# Patient Record
Sex: MALE | Race: WHITE | Employment: OTHER | URBAN - NONMETROPOLITAN AREA
[De-identification: names, ages, dates, MRNs, and addresses within clinical notes are randomized per-mention and may not be internally consistent; named-entity substitution may affect disease eponyms.]

---

## 2020-12-01 ENCOUNTER — OFFICE VISIT (OUTPATIENT)
Dept: PRIMARY CARE CLINIC | Age: 70
End: 2020-12-01
Payer: MEDICARE

## 2020-12-01 VITALS
HEIGHT: 72 IN | BODY MASS INDEX: 42.66 KG/M2 | HEART RATE: 76 BPM | RESPIRATION RATE: 20 BRPM | TEMPERATURE: 97.3 F | DIASTOLIC BLOOD PRESSURE: 82 MMHG | WEIGHT: 315 LBS | OXYGEN SATURATION: 96 % | SYSTOLIC BLOOD PRESSURE: 142 MMHG

## 2020-12-01 PROBLEM — R03.0 ELEVATED BP WITHOUT DIAGNOSIS OF HYPERTENSION: Status: ACTIVE | Noted: 2020-12-01

## 2020-12-01 PROBLEM — Z98.890 S/P TENDON REPAIR: Status: ACTIVE | Noted: 2018-11-15

## 2020-12-01 PROBLEM — N52.8 OTHER MALE ERECTILE DYSFUNCTION: Status: ACTIVE | Noted: 2020-12-01

## 2020-12-01 PROBLEM — G25.81 RESTLESS LEG SYNDROME, CONTROLLED: Status: ACTIVE | Noted: 2020-12-01

## 2020-12-01 PROCEDURE — 99203 OFFICE O/P NEW LOW 30 MIN: CPT | Performed by: FAMILY MEDICINE

## 2020-12-01 RX ORDER — CLONAZEPAM 2 MG/1
TABLET ORAL
COMMUNITY
End: 2021-11-04 | Stop reason: ALTCHOICE

## 2020-12-01 RX ORDER — ALBUTEROL SULFATE 90 UG/1
2 AEROSOL, METERED RESPIRATORY (INHALATION) EVERY 6 HOURS PRN
COMMUNITY

## 2020-12-01 RX ORDER — SILDENAFIL 100 MG/1
100 TABLET, FILM COATED ORAL PRN
COMMUNITY
End: 2020-12-01

## 2020-12-01 RX ORDER — MOMETASONE FUROATE 50 UG/1
SPRAY, METERED NASAL
COMMUNITY

## 2020-12-01 RX ORDER — FINASTERIDE 5 MG/1
5 TABLET, FILM COATED ORAL DAILY
COMMUNITY
End: 2021-01-04 | Stop reason: SDUPTHER

## 2020-12-01 RX ORDER — FUROSEMIDE 20 MG/1
20 TABLET ORAL DAILY
COMMUNITY
End: 2021-06-01 | Stop reason: SDUPTHER

## 2020-12-01 RX ORDER — ALBUTEROL SULFATE 90 UG/1
2 AEROSOL, METERED RESPIRATORY (INHALATION) 4 TIMES DAILY PRN
Qty: 1 INHALER | Refills: 5 | Status: CANCELLED | OUTPATIENT
Start: 2020-12-01

## 2020-12-01 RX ORDER — TADALAFIL 10 MG/1
10 TABLET ORAL PRN
Qty: 5 TABLET | Refills: 3 | Status: SHIPPED
Start: 2020-12-01 | End: 2021-10-04 | Stop reason: SDUPTHER

## 2020-12-01 ASSESSMENT — PATIENT HEALTH QUESTIONNAIRE - PHQ9
SUM OF ALL RESPONSES TO PHQ QUESTIONS 1-9: 0
SUM OF ALL RESPONSES TO PHQ9 QUESTIONS 1 & 2: 0
SUM OF ALL RESPONSES TO PHQ QUESTIONS 1-9: 0
SUM OF ALL RESPONSES TO PHQ QUESTIONS 1-9: 0
2. FEELING DOWN, DEPRESSED OR HOPELESS: 0
1. LITTLE INTEREST OR PLEASURE IN DOING THINGS: 0

## 2020-12-01 ASSESSMENT — ENCOUNTER SYMPTOMS
ALLERGIC/IMMUNOLOGIC NEGATIVE: 1
SHORTNESS OF BREATH: 0
EYES NEGATIVE: 1
CHEST TIGHTNESS: 0

## 2020-12-01 NOTE — PROGRESS NOTES
status:      Spouse name: Not on file    Number of children: Not on file    Years of education: Not on file    Highest education level: Not on file   Occupational History    Not on file   Social Needs    Financial resource strain: Not on file    Food insecurity     Worry: Not on file     Inability: Not on file    Transportation needs     Medical: Not on file     Non-medical: Not on file   Tobacco Use    Smoking status: Not on file   Substance and Sexual Activity    Alcohol use: Not on file    Drug use: Not on file    Sexual activity: Not on file   Lifestyle    Physical activity     Days per week: Not on file     Minutes per session: Not on file    Stress: Not on file   Relationships    Social connections     Talks on phone: Not on file     Gets together: Not on file     Attends [employer] service: Not on file     Active member of club or organization: Not on file     Attends meetings of clubs or organizations: Not on file     Relationship status: Not on file    Intimate partner violence     Fear of current or ex partner: Not on file     Emotionally abused: Not on file     Physically abused: Not on file     Forced sexual activity: Not on file   Other Topics Concern    Not on file   Social History Narrative    Not on file        No family history on file. Vitals:    12/01/20 1018 12/01/20 1029   BP: (!) 142/80 (!) 142/82   Pulse: 76    Resp: 20    Temp: 97.3 °F (36.3 °C)    TempSrc: Temporal    SpO2: 96%    Weight: (!) 339 lb (153.8 kg)    Height: 6' (1.829 m)      Estimated body mass index is 45.98 kg/m² as calculated from the following:    Height as of this encounter: 6' (1.829 m). Weight as of this encounter: 339 lb (153.8 kg). Physical Exam  Constitutional:       Appearance: Normal appearance. He is obese. HENT:      Head: Normocephalic and atraumatic. Cardiovascular:      Rate and Rhythm: Normal rate and regular rhythm. Heart sounds: Normal heart sounds.    Pulmonary: Effort: Pulmonary effort is normal.      Breath sounds: Normal breath sounds. Abdominal:      General: Bowel sounds are normal.   Musculoskeletal: Normal range of motion. Skin:     General: Skin is warm. Neurological:      General: No focal deficit present. Mental Status: He is alert. Psychiatric:         Mood and Affect: Mood normal.         Behavior: Behavior normal.         ASSESSMENT/PLAN:  1. Other male erectile dysfunction  Change back to Cialis which works better for him     Reassess BP at f/u     Return in about 4 months (around 4/1/2021) for routine f/u . An  electronic signature was used to authenticate this note.     --Sudheer Cary MD on 12/1/2020 at 8:15 AM

## 2021-01-05 RX ORDER — FINASTERIDE 5 MG/1
5 TABLET, FILM COATED ORAL DAILY
Qty: 30 TABLET | Refills: 3 | Status: SHIPPED
Start: 2021-01-05 | End: 2021-06-01

## 2021-01-13 ENCOUNTER — APPOINTMENT (OUTPATIENT)
Dept: GENERAL RADIOLOGY | Age: 71
End: 2021-01-13
Payer: MEDICARE

## 2021-01-13 ENCOUNTER — HOSPITAL ENCOUNTER (EMERGENCY)
Age: 71
Discharge: HOME | End: 2021-01-13
Attending: EMERGENCY MEDICINE
Payer: MEDICARE

## 2021-01-13 VITALS
RESPIRATION RATE: 19 BRPM | HEART RATE: 79 BPM | TEMPERATURE: 97.5 F | OXYGEN SATURATION: 96 % | DIASTOLIC BLOOD PRESSURE: 75 MMHG | SYSTOLIC BLOOD PRESSURE: 134 MMHG

## 2021-01-13 DIAGNOSIS — S61.219A: Primary | ICD-10-CM

## 2021-01-13 DIAGNOSIS — Z23 NEED FOR TETANUS BOOSTER: ICD-10-CM

## 2021-01-13 PROCEDURE — 6370000000 HC RX 637 (ALT 250 FOR IP): Performed by: PHYSICIAN ASSISTANT

## 2021-01-13 PROCEDURE — 99284 EMERGENCY DEPT VISIT MOD MDM: CPT

## 2021-01-13 PROCEDURE — 2500000003 HC RX 250 WO HCPCS

## 2021-01-13 PROCEDURE — 90715 TDAP VACCINE 7 YRS/> IM: CPT | Performed by: PHYSICIAN ASSISTANT

## 2021-01-13 PROCEDURE — 6360000002 HC RX W HCPCS: Performed by: PHYSICIAN ASSISTANT

## 2021-01-13 PROCEDURE — 73140 X-RAY EXAM OF FINGER(S): CPT

## 2021-01-13 PROCEDURE — 96372 THER/PROPH/DIAG INJ SC/IM: CPT

## 2021-01-13 PROCEDURE — 12001 RPR S/N/AX/GEN/TRNK 2.5CM/<: CPT

## 2021-01-13 PROCEDURE — 90471 IMMUNIZATION ADMIN: CPT | Performed by: PHYSICIAN ASSISTANT

## 2021-01-13 RX ORDER — AMOXICILLIN AND CLAVULANATE POTASSIUM 875; 125 MG/1; MG/1
1 TABLET, FILM COATED ORAL ONCE
Status: COMPLETED | OUTPATIENT
Start: 2021-01-13 | End: 2021-01-13

## 2021-01-13 RX ORDER — LIDOCAINE HYDROCHLORIDE 10 MG/ML
INJECTION, SOLUTION INFILTRATION; PERINEURAL
Status: COMPLETED
Start: 2021-01-13 | End: 2021-01-13

## 2021-01-13 RX ORDER — AMOXICILLIN AND CLAVULANATE POTASSIUM 875; 125 MG/1; MG/1
1 TABLET, FILM COATED ORAL 2 TIMES DAILY
Qty: 14 TABLET | Refills: 0 | Status: SHIPPED | OUTPATIENT
Start: 2021-01-13 | End: 2021-01-20

## 2021-01-13 RX ORDER — LIDOCAINE HYDROCHLORIDE 10 MG/ML
20 INJECTION, SOLUTION INFILTRATION; PERINEURAL ONCE
Status: COMPLETED | OUTPATIENT
Start: 2021-01-13 | End: 2021-01-13

## 2021-01-13 RX ADMIN — TETANUS TOXOID, REDUCED DIPHTHERIA TOXOID AND ACELLULAR PERTUSSIS VACCINE, ADSORBED 0.5 ML: 5; 2.5; 8; 8; 2.5 SUSPENSION INTRAMUSCULAR at 12:30

## 2021-01-13 RX ADMIN — LIDOCAINE HYDROCHLORIDE 20 ML: 10 INJECTION, SOLUTION INFILTRATION; PERINEURAL at 12:33

## 2021-01-13 RX ADMIN — AMOXICILLIN AND CLAVULANATE POTASSIUM 1 TABLET: 875; 125 TABLET, FILM COATED ORAL at 12:29

## 2021-01-13 ASSESSMENT — PAIN SCALES - GENERAL: PAINLEVEL_OUTOF10: 10

## 2021-01-13 NOTE — ED PROVIDER NOTES
ED Attending  [facility]   ED  Encounter Note  Admit Date/RoomTime: 2021 10:56 AM  ED Room:     NAME: Casandra Brooks II  : 1950  MRN: [id number]     Chief Complaint:  Laceration (2nd and 3rd digits cut with bandsaw, right hand)    History of Present Illness       Casandra Brooks II is a 69 y.o. old male presenting to the emergency department by private vehicle, for a laceration to the right  Middle and right ring finger, caused by band saw blade, which occurred at home approximately a few minute(s) prior to arrival.  There is  a possibility of retained foreign body in the affected area. Bleeding is  controlled. He takes no blood thinning agents. There is pain at injury site. Pt reports his ring finger tip is unable to fully straighten. Tetanus Status:  more than 5 years ago. ROS   Pertinent positives and negatives are stated within HPI, all other systems reviewed and are negative. Past Medical History:  has a past medical history of Asthma, Atelectasis, Benign essential HTN, BPH (benign prostatic hyperplasia), Diastolic dysfunction, BASHIR treated with BiPAP, and RLS (restless legs syndrome). Surgical History:  has a past surgical history that includes Total knee arthroplasty (Bilateral); Total shoulder arthroplasty (Left); and Ventricular ablation surgery (). Social History:  reports that he has never smoked. He has never used smokeless tobacco.    Family History: family history is not on file. Allergies: Patient has no known allergies. Physical Exam   Oxygen Saturation Interpretation: Normal.        ED Triage Vitals   BP Temp Temp src Pulse Resp SpO2 Height Weight   21 1056 21 1054 -- 21 1054 21 1054 21 1054 -- --   134/74 97 °F (36.1 °C)  80 20 95 %           Constitutional:  Alert, development consistent with age. HEENT:  NC/NT. Airway patent. Neck:  Normal ROM. Supple. Non-tender. Digits:   Right Middle finger middle phalanx ulnar aspect and Ring finger DIP joint dorsal aspect. Middle: 1.0 partial thickness laceration, non tender, full rom of finger. Ring finger: 2.0 cm deep laceration directly across the DIP joint line dorsally. Notable mallet deformity of the distal phalanx, pt unable to straighten the finger tip. When I move it into fully extended position, pt unable to maintain the position when I release it. Neurovascular: Motor deficit: see above            Sensory deficit: none. Pulse deficit: none. Capillary refill: normal.  Hand:              Tenderness:  none. Swelling: None. Deformity: no.             Skin:  no erythema, rash or wounds noted. Lymphatics: No lymphangitis or adenopathy noted. Neurological:  Oriented. Motor functions intact. Lab / Imaging Results   (All laboratory and radiology results have been personally reviewed by myself)  Labs:  No results found for this visit on 01/13/21. Imaging: All Radiology results interpreted by Radiologist unless otherwise noted. XR FINGER RIGHT (MIN 2 VIEWS)   Final Result   On lateral view, soft tissue defect is noted dorsal to the DIP joint. The   underlying bone is irregular suggesting tiny avulsion fractures at the   laceration site from the dorsal base of the distal phalanx. The extensor   tendon may be injured, not visible radiographically. ED Course / Medical Decision Making     Medications   lidocaine 1 % injection 20 mL (20 mLs Intradermal Given 1/13/21 1233)   Tetanus-Diphth-Acell Pertussis (BOOSTRIX) injection 0.5 mL (0.5 mLs Intramuscular Given 1/13/21 1230)   amoxicillin-clavulanate (AUGMENTIN) 875-125 MG per tablet 1 tablet (1 tablet Oral Given 1/13/21 1229)        Consult(s):   IP CONSULT TO ORTHOPEDIC SURGERY Irrigate, repair, atb, will see in office today or tomorrow.     Procedure(s):   LACERATION REPAIR PROCEDURE NOTE: Risks, benefits and alternatives (for applicable procedures below) described. Performed By: Myself}     Location: right ring finger. Length: 2.2 cm. The wound area was irrigated with sterile saline, cleansend with shur-clens and draped in a sterile fashion. Local anesthesia Lidocaine 1% without epinephrine. The wound was explored with the following results: extensor tendon laceration appears complete. Debridement: None. Undermining: None. Wound Margins Revised: no.   Flaps Aligned: yes. Single layer wound closure was performed. The skin was closed with #4 4-0 Ethilon using interrupted sutures. .   Dressing a sterile dressing, a band-aid and static splint on finger and OCL on 4th and 5th fingers and hand was placed. MDM:   Imaging was obtained based on high suspicion for fractureas per history/physical findings. Patient's tetanus was updated in department. Orthopedic surgery was consulted due to the extensor tendon laceration. They will be managing the care of this injury outpatient. Patient started on antibiotics in ED. Information for orthopedic surgery location in office provided to patient. Plan of Care/Counseling:  Emergency Attending Physician and I reviewed today's visit with the patient in addition to providing specific details for the plan of care and counseling regarding the diagnosis and prognosis. Questions are answered at this time and are agreeable with the plan. Assessment     1. Laceration of finger, right, with tendon, initial encounter    2. Need for tetanus booster      Plan   Discharged home.   Patient condition is stable    New Medications     Discharge Medication List as of 1/13/2021  1:22 PM      START taking these medications    Details   amoxicillin-clavulanate (AUGMENTIN) 875-125 MG per tablet Take 1 tablet by mouth 2 times daily for 7 days, Disp-14 tablet, R-0Normal           Electronically signed by Dixie Angel PA-C   DD: 1/13/21  **This report was transcribed using voice recognition software. Every effort was made to ensure accuracy; however, inadvertent computerized transcription errors may be present.   END OF ED PROVIDER NOTE       Mare Aleman PA-C  01/13/21 1639

## 2021-06-01 RX ORDER — FUROSEMIDE 20 MG/1
20 TABLET ORAL DAILY
Qty: 60 TABLET | Refills: 5 | Status: SHIPPED
Start: 2021-06-01 | End: 2021-07-14 | Stop reason: SDUPTHER

## 2021-06-01 RX ORDER — POTASSIUM CHLORIDE 20 MEQ/1
20 TABLET, EXTENDED RELEASE ORAL DAILY
Qty: 60 TABLET | Refills: 5 | Status: SHIPPED
Start: 2021-06-01 | End: 2021-09-30 | Stop reason: SDUPTHER

## 2021-06-01 RX ORDER — FINASTERIDE 5 MG/1
TABLET, FILM COATED ORAL
Qty: 90 TABLET | Refills: 0 | Status: SHIPPED
Start: 2021-06-01 | End: 2021-06-02

## 2021-06-02 RX ORDER — FINASTERIDE 5 MG/1
TABLET, FILM COATED ORAL
Qty: 90 TABLET | Refills: 0 | Status: SHIPPED
Start: 2021-06-02 | End: 2021-11-04 | Stop reason: SDUPTHER

## 2021-08-31 RX ORDER — FUROSEMIDE 20 MG/1
TABLET ORAL
Qty: 60 TABLET | Refills: 0 | Status: SHIPPED
Start: 2021-08-31 | End: 2021-09-30 | Stop reason: SDUPTHER

## 2021-10-01 RX ORDER — FUROSEMIDE 20 MG/1
20 TABLET ORAL DAILY
Qty: 90 TABLET | Refills: 1 | Status: SHIPPED
Start: 2021-10-01 | End: 2022-04-14

## 2021-10-01 RX ORDER — POTASSIUM CHLORIDE 20 MEQ/1
20 TABLET, EXTENDED RELEASE ORAL DAILY
Qty: 90 TABLET | Refills: 1 | Status: SHIPPED
Start: 2021-10-01 | End: 2022-05-04 | Stop reason: SDUPTHER

## 2021-10-04 RX ORDER — TADALAFIL 10 MG/1
10 TABLET ORAL PRN
Qty: 5 TABLET | Refills: 3 | Status: SHIPPED
Start: 2021-10-04 | End: 2022-05-04 | Stop reason: SDUPTHER

## 2021-11-04 ENCOUNTER — OFFICE VISIT (OUTPATIENT)
Dept: PRIMARY CARE CLINIC | Age: 71
End: 2021-11-04
Payer: MEDICARE

## 2021-11-04 VITALS
BODY MASS INDEX: 42.66 KG/M2 | SYSTOLIC BLOOD PRESSURE: 126 MMHG | OXYGEN SATURATION: 96 % | DIASTOLIC BLOOD PRESSURE: 70 MMHG | HEART RATE: 72 BPM | WEIGHT: 315 LBS | TEMPERATURE: 97.8 F | RESPIRATION RATE: 18 BRPM | HEIGHT: 72 IN

## 2021-11-04 DIAGNOSIS — Z13.220 SCREENING CHOLESTEROL LEVEL: ICD-10-CM

## 2021-11-04 DIAGNOSIS — R35.1 BENIGN PROSTATIC HYPERPLASIA WITH NOCTURIA: ICD-10-CM

## 2021-11-04 DIAGNOSIS — N40.1 BENIGN PROSTATIC HYPERPLASIA WITH NOCTURIA: ICD-10-CM

## 2021-11-04 DIAGNOSIS — I51.89 DIASTOLIC DYSFUNCTION: Primary | ICD-10-CM

## 2021-11-04 DIAGNOSIS — G47.33 OSA TREATED WITH BIPAP: ICD-10-CM

## 2021-11-04 DIAGNOSIS — Z12.5 PROSTATE CANCER SCREENING: ICD-10-CM

## 2021-11-04 DIAGNOSIS — Z12.11 COLON CANCER SCREENING: ICD-10-CM

## 2021-11-04 DIAGNOSIS — E66.01 MORBID OBESITY (HCC): ICD-10-CM

## 2021-11-04 PROCEDURE — 99214 OFFICE O/P EST MOD 30 MIN: CPT | Performed by: FAMILY MEDICINE

## 2021-11-04 RX ORDER — CLONAZEPAM 1 MG/1
2 TABLET ORAL NIGHTLY PRN
COMMUNITY
Start: 2021-10-04

## 2021-11-04 RX ORDER — ASPIRIN 81 MG/1
TABLET ORAL
COMMUNITY
End: 2022-05-04 | Stop reason: ALTCHOICE

## 2021-11-04 RX ORDER — FINASTERIDE 5 MG/1
TABLET, FILM COATED ORAL
Qty: 90 TABLET | Refills: 1 | Status: SHIPPED
Start: 2021-11-04 | End: 2022-05-04 | Stop reason: SDUPTHER

## 2021-11-04 SDOH — ECONOMIC STABILITY: FOOD INSECURITY: WITHIN THE PAST 12 MONTHS, YOU WORRIED THAT YOUR FOOD WOULD RUN OUT BEFORE YOU GOT MONEY TO BUY MORE.: NEVER TRUE

## 2021-11-04 SDOH — ECONOMIC STABILITY: FOOD INSECURITY: WITHIN THE PAST 12 MONTHS, THE FOOD YOU BOUGHT JUST DIDN'T LAST AND YOU DIDN'T HAVE MONEY TO GET MORE.: NEVER TRUE

## 2021-11-04 ASSESSMENT — PATIENT HEALTH QUESTIONNAIRE - PHQ9
2. FEELING DOWN, DEPRESSED OR HOPELESS: 0
SUM OF ALL RESPONSES TO PHQ QUESTIONS 1-9: 0
SUM OF ALL RESPONSES TO PHQ9 QUESTIONS 1 & 2: 0
SUM OF ALL RESPONSES TO PHQ QUESTIONS 1-9: 0
SUM OF ALL RESPONSES TO PHQ QUESTIONS 1-9: 0
1. LITTLE INTEREST OR PLEASURE IN DOING THINGS: 0

## 2021-11-04 ASSESSMENT — SOCIAL DETERMINANTS OF HEALTH (SDOH): HOW HARD IS IT FOR YOU TO PAY FOR THE VERY BASICS LIKE FOOD, HOUSING, MEDICAL CARE, AND HEATING?: NOT HARD AT ALL

## 2021-11-04 ASSESSMENT — ENCOUNTER SYMPTOMS
GASTROINTESTINAL NEGATIVE: 1
RESPIRATORY NEGATIVE: 1

## 2021-11-04 NOTE — PROGRESS NOTES
21  Braulio Arteaga : 1950 Sex: male  Age: 66 y.o. Assessment and Plan:  Bisi Claudio was seen today for follow-up. Diagnoses and all orders for this visit:    Diastolic dysfunction  -     Basic Metabolic Panel; Future  -     Hepatic Function Panel; Future  -     CBC Auto Differential; Future  -     Lipid Panel; Future  -     MAGNESIUM; Future    Screening cholesterol level  -     Hepatic Function Panel; Future  -     Lipid Panel; Future  -     POCT Fit Test; Future    BMI 40.0-44.9, [facility])  -     Basic Metabolic Panel; Future  -     Hepatic Function Panel; Future    Morbid obesity ([facility] [address]  -     Basic Metabolic Panel; Future  -     Hepatic Function Panel; Future  -     CBC Auto Differential; Future  -     Lipid Panel; Future    BASHIR treated with BiPAP    Benign prostatic hyperplasia with nocturia  -     finasteride (PROSCAR) 5 MG tablet; Take 1 tablet by mouth once daily  -     PSA screening; Future    Prostate cancer screening  -     PSA screening; Future    Colon cancer screening      Pt was strongly encouraged to call his cardiologist and set up an appointment in light of his reported episode  Continue current medications otherwise, refills as necessary  Discussed with the patient that he can try himself off of the Proscar for a couple of weeks to see if he notes any difference  Update routine annual blood work  Further recommendations pending results  Due for updated CRC screening  We will hold off on swallow evaluation at this time, but the patient will let me know if his symptoms recur  Dietary and lifestyle improvements recommended  Declines flu/PPSV despite my recommendation  Declines COVID, again despite my recommendation    Return in about 6 months (around 2022).         Chief Complaint   Patient presents with    Follow-up       HPI  Pt here for routine f/u     BASHIR on BiPAP -  He was having 40 events per hour, but got new machine with new settings  Last night down to 14 events per hour  He follows with pulmonology, Dr. Carlos Pete, for this as well as his restless leg    Pt c/o choking on his own spit   Lately has been better actually - past three weeks   He has rarely choked on food, but nothing that seems of concern    Pt states he also had a \"little episode with his heart\"  \"Felt like the V tach came back, but converted after 10 seconds or so\"  Felt a heavy pressure in his chest, but he just pulled his shoulders back and it passed     ED - still taking Cialis but it's not working as well as he'd like  BPH -patient has been on Proscar for quite some time and is not sure if it is helpful  Diastolic dysfunction -patient remains on Lasix and potassium daily      Problem list reviewed and updated in full with patient today as necessary. A comprehensive ROS was negative, except as documented above. Review of Systems   Respiratory: Negative. Cardiovascular:        Episode of chest pain as above   Gastrointestinal: Negative. Genitourinary: Negative. Psychiatric/Behavioral: Negative.         Current Outpatient Medications:     clonazePAM (KLONOPIN) 1 MG tablet, , Disp: , Rfl:     aspirin 81 MG EC tablet, None Entered, Disp: , Rfl:     finasteride (PROSCAR) 5 MG tablet, Take 1 tablet by mouth once daily, Disp: 90 tablet, Rfl: 1    tadalafil (CIALIS) 10 MG tablet, Take 1 tablet by mouth as needed for Erectile Dysfunction, Disp: 5 tablet, Rfl: 3    potassium chloride (KLOR-CON M20) 20 MEQ extended release tablet, Take 1 tablet by mouth daily, Disp: 90 tablet, Rfl: 1    furosemide (LASIX) 20 MG tablet, Take 1 tablet by mouth daily, Disp: 90 tablet, Rfl: 1    mometasone (NASONEX) 50 MCG/ACT nasal spray, None Entered, Disp: , Rfl:     albuterol sulfate  (90 Base) MCG/ACT inhaler, Inhale 2 puffs into the lungs every 6 hours as needed, Disp: , Rfl:   No Known Allergies    Pt's past medical and surgical history were reviewed and updated as necessary today   Pt's family and social history were reviewed and updated as necessary today        Vitals:    11/04/21 0915   BP: 126/70   Pulse: 72   Resp: 18   Temp: 97.8 °F (36.6 °C)   TempSrc: Temporal   SpO2: 96%   Weight: (!) 321 lb (145.6 kg)   Height: 6' (1.829 m)       Physical Exam  Constitutional:       Appearance: Normal appearance. HENT:      Head: Normocephalic and atraumatic. Eyes:      Conjunctiva/sclera: Conjunctivae normal.   Cardiovascular:      Rate and Rhythm: Normal rate and regular rhythm. Comments: Very distant   Pulmonary:      Effort: Pulmonary effort is normal.      Breath sounds: Normal breath sounds. Abdominal:      Palpations: Abdomen is soft. Tenderness: There is no abdominal tenderness. Musculoskeletal:         General: Normal range of motion. Skin:     General: Skin is warm and dry. Neurological:      General: No focal deficit present. Mental Status: He is alert and oriented to person, place, and time. Psychiatric:         Mood and Affect: Mood normal.         Behavior: Behavior normal.       Counseled patient as appropriate and relevant regarding above diagnosis, including possible risks and complications, especially if left uncontrolled. Counseled patient as appropriate and relevant regarding any  possible side effects, risks, and alternatives to treatment; patient and/or guardian verbalizes understanding, and is in agreement with the plan as detailed above. Reviewed age and gender appropriate health screening exams and vaccinations. Advised patient regarding importance of keeping up with recommended health maintenance and to schedule as soon as possible if overdue, as this is important in assessing for undiagnosed pathology, especially cancer, as well as protecting against potentially harmful/life threatening disease.       If discussed, any educational materials and/or home exercises printed for patient's review and were included in patient instructions on his/her After Visit Summary and given to patient at the end of visit. Advised patient to call with any new medication issues, and and other concerns/complaints prior to scheduled follow up. All questions answered to the patient's satisfaction.         Seen By:  Richard Martel MD

## 2021-12-30 LAB
CONTROL: NORMAL
HEMOCCULT STL QL: NORMAL

## 2022-01-04 DIAGNOSIS — Z13.220 SCREENING CHOLESTEROL LEVEL: ICD-10-CM

## 2022-01-04 PROCEDURE — 82274 ASSAY TEST FOR BLOOD FECAL: CPT | Performed by: FAMILY MEDICINE

## 2022-04-14 RX ORDER — FUROSEMIDE 20 MG/1
TABLET ORAL
Qty: 90 TABLET | Refills: 1 | Status: SHIPPED | OUTPATIENT
Start: 2022-04-14

## 2022-05-04 ENCOUNTER — OFFICE VISIT (OUTPATIENT)
Dept: PRIMARY CARE CLINIC | Age: 72
End: 2022-05-04
Payer: MEDICARE

## 2022-05-04 VITALS
SYSTOLIC BLOOD PRESSURE: 136 MMHG | HEART RATE: 75 BPM | OXYGEN SATURATION: 96 % | RESPIRATION RATE: 18 BRPM | BODY MASS INDEX: 42.66 KG/M2 | HEIGHT: 72 IN | WEIGHT: 315 LBS | DIASTOLIC BLOOD PRESSURE: 82 MMHG | TEMPERATURE: 98 F

## 2022-05-04 DIAGNOSIS — N52.8 OTHER MALE ERECTILE DYSFUNCTION: ICD-10-CM

## 2022-05-04 DIAGNOSIS — I51.89 DIASTOLIC DYSFUNCTION: ICD-10-CM

## 2022-05-04 DIAGNOSIS — R35.1 BENIGN PROSTATIC HYPERPLASIA WITH NOCTURIA: ICD-10-CM

## 2022-05-04 DIAGNOSIS — E66.01 MORBID OBESITY (HCC): Primary | ICD-10-CM

## 2022-05-04 DIAGNOSIS — N40.1 BENIGN PROSTATIC HYPERPLASIA WITH NOCTURIA: ICD-10-CM

## 2022-05-04 DIAGNOSIS — G47.33 OSA TREATED WITH BIPAP: ICD-10-CM

## 2022-05-04 PROCEDURE — 99214 OFFICE O/P EST MOD 30 MIN: CPT | Performed by: FAMILY MEDICINE

## 2022-05-04 RX ORDER — FINASTERIDE 5 MG/1
TABLET, FILM COATED ORAL
Qty: 90 TABLET | Refills: 1 | Status: SHIPPED
Start: 2022-05-04 | End: 2022-11-02

## 2022-05-04 RX ORDER — KETOCONAZOLE 20 MG/ML
SHAMPOO TOPICAL
COMMUNITY
Start: 2022-03-09

## 2022-05-04 RX ORDER — TADALAFIL 20 MG/1
20 TABLET ORAL PRN
Qty: 30 TABLET | Refills: 0 | Status: SHIPPED | OUTPATIENT
Start: 2022-05-04

## 2022-05-04 RX ORDER — POTASSIUM CHLORIDE 20 MEQ/1
20 TABLET, EXTENDED RELEASE ORAL DAILY
Qty: 90 TABLET | Refills: 1 | Status: SHIPPED
Start: 2022-05-04 | End: 2022-08-26 | Stop reason: SDUPTHER

## 2022-05-04 NOTE — PROGRESS NOTES
22  Marden Channel II : 1950 Sex: male  Age: 66 y.o. Assessment and Plan:  Jolanta Walters was seen today for follow-up and medication refill. Diagnoses and all orders for this visit:    Morbid obesity ([address]    Benign prostatic hyperplasia with nocturia  -     finasteride (PROSCAR) 5 MG tablet; Take 1 tablet by mouth once daily    BASHIR treated with BiPAP    Diastolic dysfunction  -     potassium chloride (KLOR-CON M20) 20 MEQ extended release tablet; Take 1 tablet by mouth daily    Other male erectile dysfunction  -     tadalafil (CIALIS) 20 MG tablet; Take 1 tablet by mouth as needed for Erectile Dysfunction      Increase Cialis to 20 mg  Precautions reviewed  If this medication does not work, he was encouraged to reach out and we can try switching back to Viagra  He is aware that his obesity does contribute to this issue  He was also encouraged to consider evaluation with urology for further recommendations  Patient will continue on his other current medications as he is generally stable  Refills as requested  Patient's complaint about choking on his saliva has improved recently so he will continue to monitor  He was strongly encouraged to alert me prior to follow-up should this start to worsen in any way    Pt declines AWV    Return in about 6 months (around 2022).         Chief Complaint   Patient presents with    Follow-up    Medication Refill       HPI  Pt here for routine f/u    States he's \"hanging in there\"   Sometimes he gets SOB at times  Also does get flutters at times  Did see his heart doctor and had monitor done  He was found to get PVCs at times  Seems like if he gets too many at once he gets weak  No changes to his meds otherwise  Remains on Lasix and potassium daily for his CHF/diastolic dysfunction     BASHIR on BiPAP - has been doing well with his new machine he got last fall   He follows with pulmonology, Dr. Jose De Jesus Crespo    ED - still taking Cialis but it's not working that well   BPH - patient has been on Proscar for quite some time and is not sure if it is helpful    At last OV, pt was c/o choking on his own spit at times  Swallow eval offered but he elected to just monitor at that time  Hasn't actually happened agian the past month - has lessened or gone away   He still denies any issues with food/liquids     Problem list reviewed and updated in full with patient today as necessary. A comprehensive ROS was negative, except as documented above. Review of Systems   Respiratory:        SOB comes and goes; doesn't seem exertional related    Cardiovascular: Positive for palpitations. Current Outpatient Medications:     Multiple Vitamins-Minerals (ICAPS AREDS FORMULA PO), Take by mouth, Disp: , Rfl:     ketoconazole (NIZORAL) 2 % shampoo, APPLY TOPICALLY TO SCALP 2 TO 3 DAYS PER WEEK, ALTERNATING WITH OTC ANTI-DANDRUFF SHAMPOOS EVERY MONTH AS DIRECTED, Disp: , Rfl:     Multiple Vitamin (MULTIVITAMIN ADULT PO), Take by mouth daily, Disp: , Rfl:     finasteride (PROSCAR) 5 MG tablet, Take 1 tablet by mouth once daily, Disp: 90 tablet, Rfl: 1    potassium chloride (KLOR-CON M20) 20 MEQ extended release tablet, Take 1 tablet by mouth daily, Disp: 90 tablet, Rfl: 1    tadalafil (CIALIS) 20 MG tablet, Take 1 tablet by mouth as needed for Erectile Dysfunction, Disp: 30 tablet, Rfl: 0    furosemide (LASIX) 20 MG tablet, Take 1 tablet by mouth once daily, Disp: 90 tablet, Rfl: 1    clonazePAM (KLONOPIN) 1 MG tablet, Take 2 mg by mouth nightly as needed.  , Disp: , Rfl:     mometasone (NASONEX) 50 MCG/ACT nasal spray, None Entered, Disp: , Rfl:     albuterol sulfate  (90 Base) MCG/ACT inhaler, Inhale 2 puffs into the lungs every 6 hours as needed, Disp: , Rfl:   No Known Allergies    Pt's past medical and surgical history were reviewed and updated as necessary today   Pt's family and social history were reviewed and updated as necessary today      Vitals:    05/04/22 1001   BP: 136/82   Pulse: 75   Resp: 18   Temp: 98 °F (36.7 °C)   TempSrc: Temporal   SpO2: 96%   Weight: (!) 333 lb (151 kg)   Height: 6' (1.829 m)       Physical Exam  Constitutional:       Appearance: Normal appearance. He is obese. HENT:      Head: Normocephalic and atraumatic. Eyes:      Conjunctiva/sclera: Conjunctivae normal.   Cardiovascular:      Rate and Rhythm: Normal rate and regular rhythm. Comments: Distant  Pulmonary:      Effort: Pulmonary effort is normal.      Breath sounds: Normal breath sounds. Abdominal:      Palpations: Abdomen is soft. Tenderness: There is no abdominal tenderness. Musculoskeletal:         General: Normal range of motion. Skin:     General: Skin is warm and dry. Neurological:      General: No focal deficit present. Mental Status: He is alert and oriented to person, place, and time. Psychiatric:         Mood and Affect: Mood normal.         Behavior: Behavior normal.        Counseled patient as appropriate and relevant regarding above diagnosis, including possible risks and complications, especially if left uncontrolled. Counseled patient as appropriate and relevant regarding any  possible side effects, risks, and alternatives to treatment; patient and/or guardian verbalizes understanding, and is in agreement with the plan as detailed above. Reviewed age and gender appropriate health screening exams and vaccinations. Advised patient regarding importance of keeping up with recommended health maintenance and to schedule as soon as possible if overdue, as this is important in assessing for undiagnosed pathology, especially cancer, as well as protecting against potentially harmful/life threatening disease. If discussed, any educational materials and/or home exercises printed for patient's review and were included in patient instructions on his/her After Visit Summary and given to patient at the end of visit.       Advised patient to call with any new medication issues, and and other concerns/complaints prior to scheduled follow up. All questions answered to the patient's satisfaction.         Seen By:  Dilia Guerra MD

## 2022-06-22 DIAGNOSIS — Z13.220 SCREENING CHOLESTEROL LEVEL: ICD-10-CM

## 2022-06-22 DIAGNOSIS — E66.01 MORBID OBESITY (HCC): ICD-10-CM

## 2022-06-22 LAB
ALBUMIN SERPL-MCNC: 4.2 G/DL (ref 3.5–5.2)
ALP BLD-CCNC: 60 U/L (ref 40–129)
ALT SERPL-CCNC: 18 U/L (ref 0–40)
ANION GAP SERPL CALCULATED.3IONS-SCNC: 12 MMOL/L (ref 7–16)
AST SERPL-CCNC: 22 U/L (ref 0–39)
BASOPHILS ABSOLUTE: 0.06 E9/L (ref 0–0.2)
BASOPHILS RELATIVE PERCENT: 0.9 % (ref 0–2)
BILIRUB SERPL-MCNC: 1.1 MG/DL (ref 0–1.2)
BILIRUBIN DIRECT: <0.2 MG/DL (ref 0–0.3)
BILIRUBIN, INDIRECT: NORMAL MG/DL (ref 0–1)
BUN BLDV-MCNC: 13 MG/DL (ref 6–23)
CALCIUM SERPL-MCNC: 9.4 MG/DL (ref 8.6–10.2)
CHLORIDE BLD-SCNC: 103 MMOL/L (ref 98–107)
CHOLESTEROL, TOTAL: 188 MG/DL (ref 0–199)
CO2: 23 MMOL/L (ref 22–29)
CREAT SERPL-MCNC: 0.8 MG/DL (ref 0.7–1.2)
EOSINOPHILS ABSOLUTE: 0.31 E9/L (ref 0.05–0.5)
EOSINOPHILS RELATIVE PERCENT: 4.8 % (ref 0–6)
GFR AFRICAN AMERICAN: >60
GFR NON-AFRICAN AMERICAN: >60 ML/MIN/1.73
GLUCOSE BLD-MCNC: 87 MG/DL (ref 74–99)
HCT VFR BLD CALC: 42.9 % (ref 37–54)
HDLC SERPL-MCNC: 41 MG/DL
HEMOGLOBIN: 13.7 G/DL (ref 12.5–16.5)
IMMATURE GRANULOCYTES #: 0.07 E9/L
IMMATURE GRANULOCYTES %: 1.1 % (ref 0–5)
LDL CHOLESTEROL CALCULATED: 118 MG/DL (ref 0–99)
LYMPHOCYTES ABSOLUTE: 1.73 E9/L (ref 1.5–4)
LYMPHOCYTES RELATIVE PERCENT: 26.9 % (ref 20–42)
MAGNESIUM: 2 MG/DL (ref 1.6–2.6)
MCH RBC QN AUTO: 29.1 PG (ref 26–35)
MCHC RBC AUTO-ENTMCNC: 31.9 % (ref 32–34.5)
MCV RBC AUTO: 91.1 FL (ref 80–99.9)
MONOCYTES ABSOLUTE: 0.52 E9/L (ref 0.1–0.95)
MONOCYTES RELATIVE PERCENT: 8.1 % (ref 2–12)
NEUTROPHILS ABSOLUTE: 3.75 E9/L (ref 1.8–7.3)
NEUTROPHILS RELATIVE PERCENT: 58.2 % (ref 43–80)
PDW BLD-RTO: 13.9 FL (ref 11.5–15)
PLATELET # BLD: 158 E9/L (ref 130–450)
PMV BLD AUTO: 11.3 FL (ref 7–12)
POTASSIUM SERPL-SCNC: 4.5 MMOL/L (ref 3.5–5)
RBC # BLD: 4.71 E12/L (ref 3.8–5.8)
SODIUM BLD-SCNC: 138 MMOL/L (ref 132–146)
TOTAL PROTEIN: 7.4 G/DL (ref 6.4–8.3)
TRIGL SERPL-MCNC: 145 MG/DL (ref 0–149)
VLDLC SERPL CALC-MCNC: 29 MG/DL
WBC # BLD: 6.4 E9/L (ref 4.5–11.5)

## 2022-08-26 DIAGNOSIS — I51.89 DIASTOLIC DYSFUNCTION: ICD-10-CM

## 2022-08-26 RX ORDER — POTASSIUM CHLORIDE 20 MEQ/1
20 TABLET, EXTENDED RELEASE ORAL DAILY
Qty: 90 TABLET | Refills: 1 | Status: SHIPPED | OUTPATIENT
Start: 2022-08-26

## 2022-08-26 NOTE — TELEPHONE ENCOUNTER
Last Appointment:  5/4/2022  Future Appointments   Date Time Provider Sharon Cruz   11/29/2022  9:00 AM Emmett Granger MD [facility]

## 2022-11-02 DIAGNOSIS — N40.1 BENIGN PROSTATIC HYPERPLASIA WITH NOCTURIA: ICD-10-CM

## 2022-11-02 DIAGNOSIS — R35.1 BENIGN PROSTATIC HYPERPLASIA WITH NOCTURIA: ICD-10-CM

## 2022-11-02 RX ORDER — FINASTERIDE 5 MG/1
TABLET, FILM COATED ORAL
Qty: 90 TABLET | Refills: 1 | OUTPATIENT
Start: 2022-11-02

## 2022-11-02 RX ORDER — FINASTERIDE 5 MG/1
TABLET, FILM COATED ORAL
Qty: 90 TABLET | Refills: 0 | Status: SHIPPED
Start: 2022-11-02 | End: 2022-11-29 | Stop reason: SDUPTHER

## 2022-11-02 NOTE — TELEPHONE ENCOUNTER
Last Appointment:  5/4/2022  Future Appointments   Date Time Provider Sharon Cruz   11/29/2022  9:00 AM Ankita Medrano MD [facility]    '

## 2022-11-29 ENCOUNTER — HOSPITAL ENCOUNTER (OUTPATIENT)
Dept: GENERAL RADIOLOGY | Age: 72
Discharge: HOME | End: 2022-12-01
Payer: MEDICARE

## 2022-11-29 ENCOUNTER — OFFICE VISIT (OUTPATIENT)
Dept: PRIMARY CARE CLINIC | Age: 72
End: 2022-11-29

## 2022-11-29 ENCOUNTER — HOSPITAL ENCOUNTER (OUTPATIENT)
Age: 72
Discharge: HOME | End: 2022-12-01
Payer: MEDICARE

## 2022-11-29 VITALS
BODY MASS INDEX: 42.66 KG/M2 | HEIGHT: 72 IN | WEIGHT: 315 LBS | DIASTOLIC BLOOD PRESSURE: 84 MMHG | HEART RATE: 76 BPM | SYSTOLIC BLOOD PRESSURE: 138 MMHG | OXYGEN SATURATION: 95 % | RESPIRATION RATE: 18 BRPM | TEMPERATURE: 97.7 F

## 2022-11-29 DIAGNOSIS — R35.1 BENIGN PROSTATIC HYPERPLASIA WITH NOCTURIA: ICD-10-CM

## 2022-11-29 DIAGNOSIS — M25.551 RIGHT HIP PAIN: ICD-10-CM

## 2022-11-29 DIAGNOSIS — M25.551 RIGHT HIP PAIN: Primary | ICD-10-CM

## 2022-11-29 DIAGNOSIS — N52.8 OTHER MALE ERECTILE DYSFUNCTION: ICD-10-CM

## 2022-11-29 DIAGNOSIS — N40.1 BENIGN PROSTATIC HYPERPLASIA WITH NOCTURIA: ICD-10-CM

## 2022-11-29 DIAGNOSIS — I51.89 DIASTOLIC DYSFUNCTION: ICD-10-CM

## 2022-11-29 PROCEDURE — 73502 X-RAY EXAM HIP UNI 2-3 VIEWS: CPT

## 2022-11-29 RX ORDER — POTASSIUM CHLORIDE 20 MEQ/1
20 TABLET, EXTENDED RELEASE ORAL DAILY
Qty: 90 TABLET | Refills: 1 | Status: SHIPPED | OUTPATIENT
Start: 2022-11-29

## 2022-11-29 RX ORDER — TADALAFIL 20 MG/1
20 TABLET ORAL PRN
Qty: 30 TABLET | Refills: 3 | Status: SHIPPED | OUTPATIENT
Start: 2022-11-29

## 2022-11-29 RX ORDER — FUROSEMIDE 20 MG/1
TABLET ORAL
Qty: 90 TABLET | Refills: 1 | Status: SHIPPED | OUTPATIENT
Start: 2022-11-29

## 2022-11-29 RX ORDER — FINASTERIDE 5 MG/1
TABLET, FILM COATED ORAL
Qty: 90 TABLET | Refills: 0 | Status: SHIPPED | OUTPATIENT
Start: 2022-11-29

## 2022-11-29 SDOH — ECONOMIC STABILITY: FOOD INSECURITY: WITHIN THE PAST 12 MONTHS, YOU WORRIED THAT YOUR FOOD WOULD RUN OUT BEFORE YOU GOT MONEY TO BUY MORE.: NEVER TRUE

## 2022-11-29 SDOH — ECONOMIC STABILITY: FOOD INSECURITY: WITHIN THE PAST 12 MONTHS, THE FOOD YOU BOUGHT JUST DIDN'T LAST AND YOU DIDN'T HAVE MONEY TO GET MORE.: NEVER TRUE

## 2022-11-29 ASSESSMENT — LIFESTYLE VARIABLES
HOW OFTEN DO YOU HAVE A DRINK CONTAINING ALCOHOL: NEVER
HOW MANY STANDARD DRINKS CONTAINING ALCOHOL DO YOU HAVE ON A TYPICAL DAY: PATIENT DOES NOT DRINK
HOW MANY STANDARD DRINKS CONTAINING ALCOHOL DO YOU HAVE ON A TYPICAL DAY: 0
HOW OFTEN DO YOU HAVE A DRINK CONTAINING ALCOHOL: 1
HOW OFTEN DO YOU HAVE SIX OR MORE DRINKS ON ONE OCCASION: 1

## 2022-11-29 ASSESSMENT — SOCIAL DETERMINANTS OF HEALTH (SDOH): HOW HARD IS IT FOR YOU TO PAY FOR THE VERY BASICS LIKE FOOD, HOUSING, MEDICAL CARE, AND HEATING?: NOT VERY HARD

## 2022-11-29 ASSESSMENT — ANXIETY QUESTIONNAIRES
1. FEELING NERVOUS, ANXIOUS, OR ON EDGE: 1
IF YOU CHECKED OFF ANY PROBLEMS ON THIS QUESTIONNAIRE, HOW DIFFICULT HAVE THESE PROBLEMS MADE IT FOR YOU TO DO YOUR WORK, TAKE CARE OF THINGS AT HOME, OR GET ALONG WITH OTHER PEOPLE: NOT DIFFICULT AT ALL
3. WORRYING TOO MUCH ABOUT DIFFERENT THINGS: NOT AT ALL
6. BECOMING EASILY ANNOYED OR IRRITABLE: 0
6. BECOMING EASILY ANNOYED OR IRRITABLE: NOT AT ALL
4. TROUBLE RELAXING: NOT AT ALL
IF YOU CHECKED OFF ANY PROBLEMS ON THIS QUESTIONNAIRE, HOW DIFFICULT HAVE THESE PROBLEMS MADE IT FOR YOU TO DO YOUR WORK, TAKE CARE OF THINGS AT HOME, OR GET ALONG WITH OTHER PEOPLE: NOT DIFFICULT AT ALL
7. FEELING AFRAID AS IF SOMETHING AWFUL MIGHT HAPPEN: 0
2. NOT BEING ABLE TO STOP OR CONTROL WORRYING: NOT AT ALL
4. TROUBLE RELAXING: 0
1. FEELING NERVOUS, ANXIOUS, OR ON EDGE: SEVERAL DAYS
7. FEELING AFRAID AS IF SOMETHING AWFUL MIGHT HAPPEN: NOT AT ALL
5. BEING SO RESTLESS THAT IT IS HARD TO SIT STILL: 0
5. BEING SO RESTLESS THAT IT IS HARD TO SIT STILL: NOT AT ALL
3. WORRYING TOO MUCH ABOUT DIFFERENT THINGS: 0
2. NOT BEING ABLE TO STOP OR CONTROL WORRYING: 0
GAD7 TOTAL SCORE: 1

## 2022-11-29 ASSESSMENT — PATIENT HEALTH QUESTIONNAIRE - PHQ9
2. FEELING DOWN, DEPRESSED OR HOPELESS: 0
SUM OF ALL RESPONSES TO PHQ9 QUESTIONS 1 & 2: 0
SUM OF ALL RESPONSES TO PHQ QUESTIONS 1-9: 0
1. LITTLE INTEREST OR PLEASURE IN DOING THINGS: 0
SUM OF ALL RESPONSES TO PHQ QUESTIONS 1-9: 0

## 2022-11-29 NOTE — PROGRESS NOTES
22  Vicenta Baez II : 1950 Sex: male  Age: 67 y.o. Assessment and Plan:  Anuj Solorzano was seen today for follow-up. Diagnoses and all orders for this visit:    Right hip pain  -     XR HIP RIGHT (2-3 VIEWS); Future    Benign prostatic hyperplasia with nocturia  -     finasteride (PROSCAR) 5 MG tablet; Take 1 tablet by mouth once daily    Diastolic dysfunction  -     potassium chloride (KLOR-CON M20) 20 MEQ extended release tablet; Take 1 tablet by mouth daily  -     furosemide (LASIX) 20 MG tablet; Take 1 tablet by mouth once daily    Other male erectile dysfunction  -     tadalafil (CIALIS) 20 MG tablet; Take 1 tablet by mouth as needed for Erectile Dysfunction    Check xray right hip  Pt can f/u with his previous ortho or would consider sports med     Otherwise stable/controlled   Cont other current meds  Refills as requested     Return in about 6 months (around 2023). Chief Complaint   Patient presents with    Follow-up       HPI  Pt here for routine f/u    Pt c/o right hip pain   He hurt it when bringing a TV in about 2 weeks ago  Sharp, stabbing, pain anteriorly  No radiation  Pain is worse with movement, especially hip flexion with either abduction/adduction   No N/T, weakness   Pain is similar to when he had torn cartilage in his knee before     Otherwise doing well   Patient reports that his other chronic health conditions are under good control  States he still gets occasional vertigo, but this is just something he will have to live with  Requesting refills of his chronic medications  These include: finasteride for BPH  Lasix and potassium for diastolic dysfunction  Cialis as needed for erectile dysfunction    Problem list reviewed and updated in full with patient today as necessary. A comprehensive ROS was negative, except as documented above.        Current Outpatient Medications:     finasteride (PROSCAR) 5 MG tablet, Take 1 tablet by mouth once daily, Disp: 90 tablet, Rfl: 0    potassium chloride (KLOR-CON M20) 20 MEQ extended release tablet, Take 1 tablet by mouth daily, Disp: 90 tablet, Rfl: 1    tadalafil (CIALIS) 20 MG tablet, Take 1 tablet by mouth as needed for Erectile Dysfunction, Disp: 30 tablet, Rfl: 3    furosemide (LASIX) 20 MG tablet, Take 1 tablet by mouth once daily, Disp: 90 tablet, Rfl: 1    Multiple Vitamins-Minerals (ICAPS AREDS FORMULA PO), Take by mouth, Disp: , Rfl:     ketoconazole (NIZORAL) 2 % shampoo, APPLY TOPICALLY TO SCALP 2 TO 3 DAYS PER WEEK, ALTERNATING WITH OTC ANTI-DANDRUFF SHAMPOOS EVERY MONTH AS DIRECTED, Disp: , Rfl:     Multiple Vitamin (MULTIVITAMIN ADULT PO), Take by mouth daily, Disp: , Rfl:     clonazePAM (KLONOPIN) 1 MG tablet, Take 2 mg by mouth nightly as needed. , Disp: , Rfl:     mometasone (NASONEX) 50 MCG/ACT nasal spray, None Entered, Disp: , Rfl:     albuterol sulfate  (90 Base) MCG/ACT inhaler, Inhale 2 puffs into the lungs every 6 hours as needed, Disp: , Rfl:   No Known Allergies    Pt's past medical and surgical history were reviewed and updated as necessary today   Pt's family and social history were reviewed and updated as necessary today          Vitals:    11/29/22 0907   BP: 138/84   Pulse: 76   Resp: 18   Temp: 97.7 °F (36.5 °C)   TempSrc: Temporal   SpO2: 95%   Weight: (!) 332 lb (150.6 kg)   Height: 6' (1.829 m)       Physical Exam  Constitutional:       Appearance: Normal appearance. HENT:      Head: Normocephalic and atraumatic. Eyes:      Conjunctiva/sclera: Conjunctivae normal.   Cardiovascular:      Rate and Rhythm: Normal rate and regular rhythm. Heart sounds: Normal heart sounds. Pulmonary:      Effort: Pulmonary effort is normal.      Breath sounds: Normal breath sounds. Abdominal:      Palpations: Abdomen is soft. Tenderness: There is no abdominal tenderness.    Musculoskeletal:      Comments: Pt moves gingerly putting weight on right hip, but ROM seems generally normal    Skin: General: Skin is warm and dry. Neurological:      General: No focal deficit present. Mental Status: He is alert and oriented to person, place, and time. Psychiatric:         Mood and Affect: Mood normal.         Behavior: Behavior normal.        Counseled patient as appropriate and relevant regarding above diagnosis, including possible risks and complications, especially if left uncontrolled. Counseled patient as appropriate and relevant regarding any  possible side effects, risks, and alternatives to treatment; patient and/or guardian verbalizes understanding, and is in agreement with the plan as detailed above. Reviewed age and gender appropriate health screening exams and vaccinations. Advised patient regarding importance of keeping up with recommended health maintenance and to schedule as soon as possible if overdue, as this is important in assessing for undiagnosed pathology, especially cancer, as well as protecting against potentially harmful/life threatening disease. If discussed, any educational materials and/or home exercises printed for patient's review and were included in patient instructions on his/her After Visit Summary and given to patient at the end of visit. Advised patient to call with any new medication issues, and and other concerns/complaints prior to scheduled follow up. All questions answered to the patient's satisfaction.         Seen By:  Rick Schwarz MD

## 2022-12-01 ENCOUNTER — TELEPHONE (OUTPATIENT)
Dept: PRIMARY CARE CLINIC | Age: 72
End: 2022-12-01

## 2022-12-01 DIAGNOSIS — M25.551 RIGHT HIP PAIN: Primary | ICD-10-CM

## 2022-12-01 RX ORDER — TRAMADOL HYDROCHLORIDE 50 MG/1
50 TABLET ORAL EVERY 8 HOURS PRN
Qty: 28 TABLET | Refills: 0 | Status: SHIPPED | OUTPATIENT
Start: 2022-12-01 | End: 2022-12-08

## 2022-12-14 ENCOUNTER — OFFICE VISIT (OUTPATIENT)
Dept: ORTHOPEDIC SURGERY | Age: 72
End: 2022-12-14

## 2022-12-14 VITALS — WEIGHT: 315 LBS | HEIGHT: 72 IN | BODY MASS INDEX: 42.66 KG/M2

## 2022-12-14 DIAGNOSIS — M16.11 PRIMARY OSTEOARTHRITIS OF RIGHT HIP: ICD-10-CM

## 2022-12-14 DIAGNOSIS — M25.551 RIGHT HIP PAIN: Primary | ICD-10-CM

## 2022-12-14 RX ORDER — LIDOCAINE HYDROCHLORIDE 10 MG/ML
5 INJECTION, SOLUTION INFILTRATION; PERINEURAL ONCE
Status: COMPLETED | OUTPATIENT
Start: 2022-12-14 | End: 2022-12-14

## 2022-12-14 RX ORDER — TRIAMCINOLONE ACETONIDE 40 MG/ML
40 INJECTION, SUSPENSION INTRA-ARTICULAR; INTRAMUSCULAR ONCE
Status: COMPLETED | OUTPATIENT
Start: 2022-12-14 | End: 2022-12-14

## 2022-12-14 RX ADMIN — TRIAMCINOLONE ACETONIDE 40 MG: 40 INJECTION, SUSPENSION INTRA-ARTICULAR; INTRAMUSCULAR at 17:00

## 2022-12-14 RX ADMIN — LIDOCAINE HYDROCHLORIDE 5 ML: 10 INJECTION, SOLUTION INFILTRATION; PERINEURAL at 17:00

## 2022-12-14 SDOH — HEALTH STABILITY: PHYSICAL HEALTH: ON AVERAGE, HOW MANY DAYS PER WEEK DO YOU ENGAGE IN MODERATE TO STRENUOUS EXERCISE (LIKE A BRISK WALK)?: 0 DAYS

## 2022-12-14 NOTE — PROGRESS NOTES
PROCEDURE NOTE:    DIAGNOSIS      RIGHT hip pain    PROCEDURE     Ultrasound-guided RIGHT femoroacetabular?(hip) joint corticosteroid injection. PROCEDURAL PAUSE     Procedural pause conducted to verify: ?correct patient identity, procedure to be performed, and as applicable, correct side and site, correct patient position, and availability of implants, special equipment, or special requirements. PROCEDURE DETAILS     The procedure was carried out under sterile technique. Patient Position: ? Supine. Localization Process: ? The hip joint was evaluated under ultrasound prior to starting the procedure. ? The neurovascular bundle (femoral nerve, artery, vein) was identified under ultrasound prior to starting the procedure. ? The skin was prepped with Betadine and Alcohol. Approach: ? In-plane. Local Anesthesia: Under live ultrasound guidance, local anesthesia was obtained with vapocoolant cold spray and 2 cc of 1% lidocaine using a 25-gauge 2-inch needle advanced from an in-plane approach to the hip joint capsule at the femoral head-neck junction. ?     Injection/Aspiration: ?A 22-gauge 3-1/2-inch needle was advanced from an in-plane approach into the hip joint. ?Os was contacted at the femoral head-neck junction. ? After visualization of the needle tip in the target area and negative aspiration for blood, a mixture of 3 cc of 1% lidocaine and 1 cc of Kenalog (40 mg/cc) was injected into the hip joint with excellent sonographic flow. Images of procedure were permanently recorded. Postprocedure Care: ? The patient will avoid soaking the hip under water for two days and avoid heavy exertion with the hip. ?The patient will contact me with any problems related to the injection. PATIENT EDUCATION     Ready to learn, no apparent learning barriers were identified; learning preferences include listening. ? Explained diagnosis and treatment plan; patient expressed understanding of the content.      INFORMED CONSENT     Following denial of allergy and review of potential side effects and complications including but not necessarily limited to infection, allergic reaction, local tissue breakdown, systemic effects of corticosteroids, elevation of blood glucose, injury to soft tissue and/or nerves, and seizure, the patient indicated their understanding and agreed to proceed. ? Discussed the risks, benefits, alternatives, and the necessity of other members of the healthcare team participating in the procedure. ?All questions answered and consent given. All questions and concerns were addressed and consent was verbalized by the patient. FOLLOW UP    Follow up in 6-8 weeks.     Electronically signed by Alondra Bermeo MD on 12/14/2022 at 2:58 PM

## 2022-12-14 NOTE — PROGRESS NOTES
[facility]  PRIMARY CARE SPORTS MEDICINE  DATE OF VISIT : 2022    Patient : Romilda Goodell  Age : 67 y.o.  : 1950  MRN : [id number]   ______________________________________________________________________    Chief Complaint :   Chief Complaint   Patient presents with    Hip Pain     (R) hip pain. Pain has been ongoing for 1 months time. No PAOLO. Patient has tried oral medications without relief. Pain is located in the groin area. HPI : Romilda Goodell is 67 y.o. male who presented to the clinic today for evaluation of right hip pain. Onset of the symptoms was about 1 month ago, with no known mechanism of injury. Current symptoms include right groin pain. Patient denies numbness and tingling. Pain is aggravated by any weight bearing activity. Evaluation to date: XRs of the right hip which demonstrate no acute fractures or dislocations. Treatment to date: avoidance of offending activity and OTC analgesics which are not very effective.      Past Medical History :  Past Medical History:   Diagnosis Date    Asthma     questionable asthma per pulm    Atelectasis     Benign essential HTN     BPH (benign prostatic hyperplasia)     Diastolic dysfunction     Grade 1 per echo from 2020 through [address]; EF 55%    BASHIR treated with BiPAP     follows with pulm     RLS (restless legs syndrome)     treated with Klonopin through pulm      Past Surgical History:   Procedure Laterality Date    SHOULDER ARTHROPLASTY Left     2013    TOTAL KNEE ARTHROPLASTY Bilateral     R , L     VENTRICULAR ABLATION SURGERY      ventricular tachycardia       Allergies :   No Known Allergies    Medication List :    Current Outpatient Medications   Medication Sig Dispense Refill    finasteride (PROSCAR) 5 MG tablet Take 1 tablet by mouth once daily 90 tablet 0    potassium chloride (KLOR-CON M20) 20 MEQ extended release tablet Take 1 tablet by mouth daily 90 tablet 1    tadalafil (CIALIS) 20 MG tablet Take 1 tablet by mouth as needed for Erectile Dysfunction 30 tablet 3    furosemide (LASIX) 20 MG tablet Take 1 tablet by mouth once daily 90 tablet 1    Multiple Vitamins-Minerals (ICAPS AREDS FORMULA PO) Take by mouth      ketoconazole (NIZORAL) 2 % shampoo APPLY TOPICALLY TO SCALP 2 TO 3 DAYS PER WEEK, ALTERNATING WITH OTC ANTI-DANDRUFF SHAMPOOS EVERY MONTH AS DIRECTED      Multiple Vitamin (MULTIVITAMIN ADULT PO) Take by mouth daily      clonazePAM (KLONOPIN) 1 MG tablet Take 2 mg by mouth nightly as needed. mometasone (NASONEX) 50 MCG/ACT nasal spray None Entered      albuterol sulfate  (90 Base) MCG/ACT inhaler Inhale 2 puffs into the lungs every 6 hours as needed       No current facility-administered medications for this visit.      ______________________________________________________________________    Physical Exam :    Vitals: Ht 6' (1.829 m)   Wt (!) 332 lb (150.6 kg)   BMI 45.03 kg/m²   General Appearance: Nontoxic, awake, alert, and in no acute distress. Chest wall/Lung: Respirations regular and unlabored. No cyanosis. Heart: RRR, distal pulses intact. Neurologic: Alert&Oriented x3. Sensation grossly intact. No focal motor deficits detected. Musculokeletal: RIGHT Hip:  ROM: Forward flexion to 120, IR 30, ER 35  TTP: ( - ) Greater trochanter, ( - ) Hip flexors, ( - ) SI Joint  Special tests: ( - ) Logroll, ( + ) FADIR, ( + ) CIERRA, ( - ) SLR, ( - ) Stinchfield, ( - ) Pelvic shear   ______________________________________________________________________    Assessment & Plan :    1. Right hip pain  2. Primary osteoarthritis of right hip  Patient presents to the office today for evaluation of right hip pain. History, referring provider note, physical exam and imaging (as interpreted by me) are consistent with right hip osteoarthritis.   Treatment options discussed with patient in the office today including activity modification, oral anti-inflammatories, physical therapy, injection options, advanced imaging in the form of a MRI and referral to an orthopedic surgeon for discussion of surgical opinion. Patient wishes to proceed with conservative treatment in the form of an ultrasound-guided corticosteroid injection which was performed in the office today, please see separate procedure note for further details. Patient will follow up in 6 weeks for reevaluation of symptoms and consider escalation therapy should symptoms persist.  Patient is agreeable with above plan all questions and concerns were addressed in the office today. - US ARTHR/ASP/INJ MAJOR JNT/BURSA RIGHT; Future  - lidocaine 1 % injection 5 mL  - triamcinolone acetonide (KENALOG-40) injection 40 mg    Return to Office: Return in about 6 weeks (around 1/25/2023) for injection follow up.     John Chapman MD

## 2022-12-27 DIAGNOSIS — M16.11 PRIMARY OSTEOARTHRITIS OF RIGHT HIP: Primary | ICD-10-CM

## 2022-12-28 ENCOUNTER — PATIENT MESSAGE (OUTPATIENT)
Dept: PRIMARY CARE CLINIC | Age: 72
End: 2022-12-28

## 2022-12-28 DIAGNOSIS — M25.551 RIGHT HIP PAIN: Primary | ICD-10-CM

## 2022-12-28 RX ORDER — TRAMADOL HYDROCHLORIDE 100 MG/1
100 TABLET, COATED ORAL 3 TIMES DAILY PRN
Qty: 30 TABLET | Refills: 0 | Status: SHIPPED | OUTPATIENT
Start: 2022-12-28

## 2022-12-28 NOTE — TELEPHONE ENCOUNTER
Sent      Take only PRN  Can cont tylenol and ibuprofen with this - should take the tramadol only if those aren't working well enough at a given time

## 2022-12-28 NOTE — TELEPHONE ENCOUNTER
If hte tramadol is helpful at all, I would have him keep taking it     We can increase to 100mg to see if it lasts longer, or change the 50mg to every 6 hours instead of 8   In the meantime would still be able to take Tylenol or ibuprofen in advance of the four hours wearing off

## 2022-12-28 NOTE — TELEPHONE ENCOUNTER
From: Leticia Morton II  To: Dr. Tess Mohs: 12/28/2022 12:10 PM EST  Subject: Pain and sleep    I have quit taking the Tramidol 50 because its effectiveness only lasts for 4 hours and I can only take it every 8 hours. I have been treating the pain with tylenol and ibuprofen. The pain wakes me up several times during the night. Dr Anayeli Patton gave me a shot of Kenalog in the hip, but it did not releve the pain. The earliest I can see Dr Adelaida Gama is February 1. Is there something you could prescribe that would last longer and or help me sleep? I can get along with tylenol/ibuprofens during the day. If I need to come to the office again I will gladly do so.    Respectfully   Justin Doherty  1950

## 2023-01-05 ENCOUNTER — OFFICE VISIT (OUTPATIENT)
Dept: FAMILY MEDICINE CLINIC | Age: 73
End: 2023-01-05

## 2023-01-05 VITALS
HEART RATE: 77 BPM | HEIGHT: 72 IN | WEIGHT: 315 LBS | BODY MASS INDEX: 42.66 KG/M2 | OXYGEN SATURATION: 95 % | SYSTOLIC BLOOD PRESSURE: 136 MMHG | DIASTOLIC BLOOD PRESSURE: 82 MMHG | TEMPERATURE: 98 F | RESPIRATION RATE: 20 BRPM

## 2023-01-05 DIAGNOSIS — L02.91 ABSCESS: Primary | ICD-10-CM

## 2023-01-05 RX ORDER — TRIAMCINOLONE ACETONIDE 0.25 MG/G
OINTMENT TOPICAL
COMMUNITY
Start: 2022-08-18

## 2023-01-05 RX ORDER — CEPHALEXIN 500 MG/1
500 CAPSULE ORAL 2 TIMES DAILY
Qty: 14 CAPSULE | Refills: 0 | Status: SHIPPED | OUTPATIENT
Start: 2023-01-05 | End: 2023-01-12

## 2023-01-05 RX ORDER — SULFAMETHOXAZOLE AND TRIMETHOPRIM 800; 160 MG/1; MG/1
1 TABLET ORAL 2 TIMES DAILY
Qty: 20 TABLET | Refills: 0 | Status: SHIPPED | OUTPATIENT
Start: 2023-01-05 | End: 2023-01-15

## 2023-01-05 RX ORDER — TRAMADOL HYDROCHLORIDE 50 MG/1
TABLET ORAL
COMMUNITY
Start: 2022-12-28

## 2023-01-05 NOTE — PROGRESS NOTES
2023     Shayne Vargas II 67 y.o. male    : 1950   Chief Complaint   Other      History of Present Illness   Source of history provided by:  patient. Megan Dumont is a 67 y.o. old male who presents to walk-in care for evaluation of redness behind left ear , which began 7 days ago. Reports the area is warm to touch, moderately painful, and swollen. Denies lymphangitic streaking. Denies any bleeding or active drainage. Since onset the symptoms have progressed. Denies any fever, chills, HA, recent illness, myalgias, nausea, vomiting, or lethargy. ROS   Past Medical History:   Past Medical History:   Diagnosis Date    Asthma     questionable asthma per pulm    Atelectasis     Benign essential HTN     BPH (benign prostatic hyperplasia)     Diastolic dysfunction     Grade 1 per echo from 2020 through [facility]; EF 55%    BASHIR treated with BiPAP     follows with pulm     RLS (restless legs syndrome)     treated with Klonopin through pulm      Past Surgical History:  has a past surgical history that includes Total knee arthroplasty (Bilateral); Total shoulder arthroplasty (Left); and Ventricular ablation surgery (). Social History:  reports that he has never smoked. He has never used smokeless tobacco.  Family History: family history is not on file. Allergies: Patient has no known allergies. Unless otherwise stated in this report the patient's positive and negative responses for review of systems for constitutional, eyes, ENT, cardiovascular, respiratory, gastrointestinal, neurological, , musculoskeletal, and integument systems and related systems to the presenting problem are either stated in the history of present illness or were not pertinent or were negative for the symptoms and/or complaints related to the presenting medical problem. Positives and pertinent negatives as per HPI. All others reviewed and are negative.     Physical Exam     VS:     Vitals:    23 1332   BP: 136/82   Pulse: 77   Resp: 20   Temp: 98 °F (36.7 °C)   TempSrc: Temporal   SpO2: 95%   Weight: (!) 332 lb (150.6 kg)   Height: 6' (1.829 m)     Oxygen Saturation Interpretation: Normal.    Constitutional:  Alert, development consistent with age. NAD. Lungs:  CTAB without wheezing, rales, or rhonchi. Heart:  Regular rate and rhythm, no pathologic murmurs, rubs, or gallops. Skin:  Normal turgor and appropriately dry to touch. Raised, erythematous region over the mastoid process measuring approximately 1in x1in in size, consistent with an abscess. Moderate TTP and warmth over the same area. No drainage noted. No lymphangitic streaking. Area is with induration and without fluctuance. Neurological:  Orientation age-appropriate unless noted elseware. Motor functions intact. Assessment / Larry Tayler was seen today for other. Diagnoses and all orders for this visit:    Abscess  -     cephALEXin (KEFLEX) 500 MG capsule; Take 1 capsule by mouth 2 times daily for 7 days  -     sulfamethoxazole-trimethoprim (BACTRIM DS;SEPTRA DS) 800-160 MG per tablet; Take 1 tablet by mouth 2 times daily for 10 days      Scripts written for Keflex and Bactrim, side effects discussed. Wound care measures discussed at length. Advise f/u with PCP in 2-3 days for recheck and packing removal. ED sooner if symptoms worsen or change. ED immediately with the development of fever, body aches, shaking chills, lethargy, CP, or SOB. Pt is in agreement with this care plan. All questions answered.

## 2023-01-22 ENCOUNTER — PATIENT MESSAGE (OUTPATIENT)
Dept: PRIMARY CARE CLINIC | Age: 73
End: 2023-01-22

## 2023-01-22 DIAGNOSIS — M25.551 RIGHT HIP PAIN: Primary | ICD-10-CM

## 2023-01-23 RX ORDER — HYDROCODONE BITARTRATE AND ACETAMINOPHEN 5; 325 MG/1; MG/1
1 TABLET ORAL EVERY 6 HOURS PRN
Qty: 20 TABLET | Refills: 0 | Status: SHIPPED | OUTPATIENT
Start: 2023-01-23 | End: 2023-01-28

## 2023-01-23 NOTE — TELEPHONE ENCOUNTER
From: Skyla Aponte II  To: Dr. Geraldine Guillen: 1/22/2023 4:51 AM EST  Subject: Sleep aid    I cannot get into the hip surgeon until February 1. Would you be willing to prescribe a sleeping pill to help me get through the night. The hip pain is manageable during the day with Tylenol/Advil and just moving to a different position. But at night in my sleep, I will shift positions and the pain wakes me up and I have trouble falling back to sleep. I have quit driving because of the Tramidol which I only take at bedtime. I have tried melatonin over the counter, but it doesnt help. About 15 years ago when I had my knee replacement, the doctor prescribed May Neth which seemed to work but I dont know if that would be compatible with Tramidol. If you want me to come in or have a tele-health visit I will.    Thanks for your assistance   Kallie Madrigal

## 2023-01-28 DIAGNOSIS — R35.1 BENIGN PROSTATIC HYPERPLASIA WITH NOCTURIA: ICD-10-CM

## 2023-01-28 DIAGNOSIS — N40.1 BENIGN PROSTATIC HYPERPLASIA WITH NOCTURIA: ICD-10-CM

## 2023-01-29 SDOH — HEALTH STABILITY: PHYSICAL HEALTH: ON AVERAGE, HOW MANY DAYS PER WEEK DO YOU ENGAGE IN MODERATE TO STRENUOUS EXERCISE (LIKE A BRISK WALK)?: 0 DAYS

## 2023-01-30 RX ORDER — FINASTERIDE 5 MG/1
TABLET, FILM COATED ORAL
Qty: 90 TABLET | Refills: 0 | Status: SHIPPED | OUTPATIENT
Start: 2023-01-30

## 2023-02-01 ENCOUNTER — OFFICE VISIT (OUTPATIENT)
Dept: ORTHOPEDIC SURGERY | Age: 73
End: 2023-02-01
Payer: MEDICARE

## 2023-02-01 VITALS — WEIGHT: 315 LBS | BODY MASS INDEX: 42.66 KG/M2 | HEIGHT: 72 IN

## 2023-02-01 DIAGNOSIS — M16.11 PRIMARY OSTEOARTHRITIS OF RIGHT HIP: Primary | ICD-10-CM

## 2023-02-01 PROCEDURE — 1123F ACP DISCUSS/DSCN MKR DOCD: CPT | Performed by: ORTHOPAEDIC SURGERY

## 2023-02-01 PROCEDURE — 99204 OFFICE O/P NEW MOD 45 MIN: CPT | Performed by: ORTHOPAEDIC SURGERY

## 2023-02-01 NOTE — PROGRESS NOTES
New Hip Patient     Referring Provider:   Anayeli Patton MD  [address]    CHIEF COMPLAINT:   Chief Complaint   Patient presents with    Hip Pain     Ref - Dr Mendy King - Primary osteoarthritis of right hip - 12/14/222 - Ultrasound-guided RIGHT femoroacetabular?(hip) joint corticosteroid injection - pain since 11/2022 - bothersome at night and getting into / out of car - he states was lifting / moving a large TV and intense pain    Surgical Consult     Discuss Rt MOLLY        HPI:    Justin Doherty is a 67y.o. year old male who is seen today  for evaluation of right hip pain. He reports the pain has been ongoing for the past {NUMBERS 1-10:17198} {days/wks/mos/yrs:858874}. He   {KBIH:06900} recall a specific injury which started the pain.  ***. He reports the pain is worse with ***, better with ***. The patient {DOES/DOES NOT:06874} have mechanical symptoms. He denies a feeling of instability. The prior treatments have been {prev rx:299855}. The patient   {HAS/HAS NOT:788551360} responded to the treatment. The patient is not working. ***    PAST MEDICAL HISTORY  Past Medical History:   Diagnosis Date    Asthma     questionable asthma per pulm    Atelectasis     Benign essential HTN     BPH (benign prostatic hyperplasia)     Diastolic dysfunction     Grade 1 per echo from 6/2020 through [address]; EF 55%    BASHIR treated with BiPAP     follows with pulm     RLS (restless legs syndrome)     treated with Klonopin through pulm        PAST SURGICAL HISTORY  Past Surgical History:   Procedure Laterality Date    SHOULDER ARTHROPLASTY Left     2013    TOTAL KNEE ARTHROPLASTY Bilateral     R 1987, L 2010    VENTRICULAR ABLATION SURGERY  2008    ventricular tachycardia       FAMILY HISTORY   No family history on file.     SOCIAL HISTORY  Social History     Occupational History    Not on file   Tobacco Use    Smoking status: Never    Smokeless tobacco: Never   Substance and Sexual Activity Alcohol use: Not on file    Drug use: Not on file    Sexual activity: Not on file       CURRENT MEDICATIONS     Current Outpatient Medications:     HYDROCODONE-ACETAMINOPHEN 5-325 MG PO TABS, STARTER PACK,, , Disp: , Rfl:     finasteride (PROSCAR) 5 MG tablet, Take 1 tablet by mouth once daily, Disp: 90 tablet, Rfl: 0    triamcinolone (KENALOG) 0.025 % ointment, , Disp: , Rfl:     Handicap Placard MISC, by Does not apply route It is my medical opinion that Rochelle Soto requires a disability parking placard for the following reasons: He has limited walking ability due to an orthopedic condition. Duration of need: 1 year, Disp: 1 each, Rfl: 0    potassium chloride (KLOR-CON M20) 20 MEQ extended release tablet, Take 1 tablet by mouth daily, Disp: 90 tablet, Rfl: 1    tadalafil (CIALIS) 20 MG tablet, Take 1 tablet by mouth as needed for Erectile Dysfunction, Disp: 30 tablet, Rfl: 3    furosemide (LASIX) 20 MG tablet, Take 1 tablet by mouth once daily, Disp: 90 tablet, Rfl: 1    Multiple Vitamins-Minerals (ICAPS AREDS FORMULA PO), Take by mouth, Disp: , Rfl:     ketoconazole (NIZORAL) 2 % shampoo, APPLY TOPICALLY TO SCALP 2 TO 3 DAYS PER WEEK, ALTERNATING WITH OTC ANTI-DANDRUFF SHAMPOOS EVERY MONTH AS DIRECTED, Disp: , Rfl:     Multiple Vitamin (MULTIVITAMIN ADULT PO), Take by mouth daily, Disp: , Rfl:     clonazePAM (KLONOPIN) 1 MG tablet, Take 2 mg by mouth nightly as needed.  , Disp: , Rfl:     mometasone (NASONEX) 50 MCG/ACT nasal spray, None Entered, Disp: , Rfl:     albuterol sulfate  (90 Base) MCG/ACT inhaler, Inhale 2 puffs into the lungs every 6 hours as needed, Disp: , Rfl:     ALLERGIES  No Known Allergies    Controlled Substances Monitoring:      REVIEW OF SYSTEMS:     Constitutional:  Negative for weight loss, fevers, chills, fatigue  Cardiovascular: Negative for chest pain, palpitations  Pulmonary: Negative for shortness of breath, labored breathing, cough  GI: negative for abdominal pain, nausea, vomitting   MSK: per HPI  Skin: negative for rash, open wounds    All other systems reviewed and are negative           PHYSICAL EXAM     Vitals:    02/01/23 1100   Weight: (!) 333 lb 5.4 oz (151.2 kg)   Height: 6' (1.829 m)       Height: 6' (1.829 m)  Weight: 333 lb 5.4 oz actual BMI:  Body mass index is 45.21 kg/m². General: The patient is alert and oriented x 3, appears to be stated age and in no distress. HEENT: head is normocephalic, atraumatic. EOMI. Neck: supple, trachea midline, no thyromegaly   Cardiovascular: peripheral pulses palpable.   Normal Capillary refill   Respiratory: breathing unlabored, chest expansion symmetric   Skin: no rash, no open wounds, no erythema  Psych: normal affect; mood stable  Neurologic: gait normal, sensation grossly intact in extremities  MSK:     Hip:  ***     IMAGING:    XR: AP pelvis, AP and Lateral of the involved hip display ***       ASSESSMENT  Right hip     PLAN  ***        Jing Smallwood MD  Orthopaedic Surgery   2/1/23  11:03 AM

## 2023-02-01 NOTE — PROGRESS NOTES
New Patient Orthopaedic Progress Note    Katie Fiore II is a 67 y.o. male, his YOB: 1950 with the following history as recorded in [facility]:      Patient Active Problem List    Diagnosis Date Noted    BMI 40.0-44.9, adult ([facility] [address] 11/04/2021    Morbid obesity ([facility] [address] 11/04/2021    BASHIR treated with BiPAP     Benign essential HTN     BPH (benign prostatic hyperplasia)     Mild intermittent asthma without complication 78/01/6543    Diastolic dysfunction 19/51/3739    Other male erectile dysfunction 12/01/2020    Restless leg syndrome, controlled 12/01/2020    Elevated BP without diagnosis of hypertension 12/01/2020    RLS (restless legs syndrome)     Atelectasis     Asthma     S/P tendon repair 11/15/2018     Current Outpatient Medications   Medication Sig Dispense Refill    HYDROCODONE-ACETAMINOPHEN 5-325 MG PO TABS, STARTER PACK,       finasteride (PROSCAR) 5 MG tablet Take 1 tablet by mouth once daily 90 tablet 0    triamcinolone (KENALOG) 0.025 % ointment       Handicap Placard MISC by Does not apply route It is my medical opinion that Makenzie Araujo requires a disability parking placard for the following reasons:  He has limited walking ability due to an orthopedic condition. Duration of need: 1 year 1 each 0    potassium chloride (KLOR-CON M20) 20 MEQ extended release tablet Take 1 tablet by mouth daily 90 tablet 1    tadalafil (CIALIS) 20 MG tablet Take 1 tablet by mouth as needed for Erectile Dysfunction 30 tablet 3    furosemide (LASIX) 20 MG tablet Take 1 tablet by mouth once daily 90 tablet 1    Multiple Vitamins-Minerals (ICAPS AREDS FORMULA PO) Take by mouth      ketoconazole (NIZORAL) 2 % shampoo APPLY TOPICALLY TO SCALP 2 TO 3 DAYS PER WEEK, ALTERNATING WITH OTC ANTI-DANDRUFF SHAMPOOS EVERY MONTH AS DIRECTED      Multiple Vitamin (MULTIVITAMIN ADULT PO) Take by mouth daily      clonazePAM (KLONOPIN) 1 MG tablet Take 2 mg by mouth nightly as needed.        mometasone (NASONEX) 50 MCG/ACT nasal spray None Entered      albuterol sulfate  (90 Base) MCG/ACT inhaler Inhale 2 puffs into the lungs every 6 hours as needed       No current facility-administered medications for this visit. Allergies: Patient has no known allergies. Past Medical History:   Diagnosis Date    Asthma     questionable asthma per pulm    Atelectasis     Benign essential HTN     BPH (benign prostatic hyperplasia)     Diastolic dysfunction     Grade 1 per echo from 6/2020 through [address]; EF 55%    BASHIR treated with BiPAP     follows with pulm     RLS (restless legs syndrome)     treated with Klonopin through pulm      Past Surgical History:   Procedure Laterality Date    SHOULDER ARTHROPLASTY Left     2013    TOTAL KNEE ARTHROPLASTY Bilateral     R 1987, L 2010    VENTRICULAR ABLATION SURGERY  2008    ventricular tachycardia     No family history on file. Social History     Tobacco Use    Smoking status: Never    Smokeless tobacco: Never   Substance Use Topics    Alcohol use: Not on file                             Chief Complaint   Patient presents with    Hip Pain     Ref - Dr Lakeshia Tran - Primary osteoarthritis of right hip - 12/14/222 - Ultrasound-guided RIGHT femoroacetabular?(hip) joint corticosteroid injection - pain since 11/2022 - bothersome at night and getting into / out of car - he states was lifting / moving a large TV and intense pain    Surgical Consult     Discuss Rt MOLLY       SUBJECTIVE: Hip Pain  Patient complains of right hip pain. Onset of the symptoms was 3 months ago. Inciting event: twisted while loading a 75 inch TV onto a trailer . The patient reports the hip pain is worse with weight bearing, is aggravated by walking, and is worse with flexion and abduction functions . Associated symptoms: none. Patient has had no prior hip problems. Previous visits for this problem: yes, last seen 2 months ago by Dr. Leslie Cárdenas . Symptoms have been reported as worsening with the inability to sleep at night.   Evaluation to date: plain films, which were normal. Treatment to date: OTC analgesics, which have been ineffective and intraarticular hip injection performed by Dr. Jacques Douglass on 12/2022 . Review of Systems   Constitutional: Negative for fever, chills, diaphoresis, appetite change and fatigue. HENT: Negative for dental issues, hearing loss and tinnitus. Negative for congestion, sinus pressure, sneezing, sore throat. Negative for headache. Eyes: Negative for visual disturbance, blurred and double vision. Negative for pain, discharge, redness and itching  Respiratory: Negative for cough, shortness of breath and wheezing. Cardiovascular: Negative for chest pain, palpitations and leg swelling. No dyspnea on exertion   Gastrointestinal:   Negative for nausea, vomiting, abdominal pain, diarrhea, constipation  or black or bloody. Hematologic\Lymphatic:  negative for bleeding, petechiae,   Genitourinary: Negative for hematuria and difficulty urinating. Musculoskeletal: Negative for neck pain and stiffness. Negative for back pain, see HPI  Skin: Negative for pallor, rash and wound. Neurological: Negative for dizziness, tremors, seizures, weakness, light-headedness, no TIA or stroke symptoms. No numbness and headaches. Psychiatric/Behavioral: Negative. OBJECTIVE:      Physical Examination:   General appearance: alert, well appearing, and in no distress,  normal appearing weight.  No visible signs of trauma   Mental status: alert, oriented to person, place, and time, normal mood, behavior, speech, dress, motor activity, and thought processes  Abdomen: soft, nondistended  Resp:   resp easy and unlabored, no audible wheezes note, normal symmetrical expansion of both hemithoraces  Cardiac: distal pulses palpable, skin and extremities well perfused  Neurological: alert, oriented X3, normal speech, no focal findings or movement disorder noted, motor and sensory grossly normal bilaterally, normal muscle tone, no tremors, strength 5/5, normal gait and station  HEENT: normochephalic atraumatic, external ears and eyes normal, sclera normal, neck supple, no nasal discharge. Extremities:   peripheral pulses normal, no edema, redness or tenderness in the calves   Skin: normal coloration, no rashes or open wounds, no suspicious skin lesions noted  Psych: Affect euthymic   Musculoskeletal:   Extremity:  Gait exam is antalgic. The pelvis is level. Trendelenberg sign is  negative  In the supine position, there is/no pain with log roll. Hip range of motion is 0-90 flexion With the hip flexed 90 degrees, internal rotation is limited secondary to pain and external rotation is 40. FADIR (flexion, adduction, internal rotation) testing is positive. CIERRA (flexion, abduction, external rotation) testing is negative. Resisted adduction displays normal strength and no pain. Unable to maintain straight leg raise. Hip flexion testing positive for weakness. With the patient in the lateral decubitus position with affected hip up, there is/no tenderness to palpation over the greater trochanter. Resisted abduction is  negative for pain and  negative for weakness. Ht 6' (1.829 m)   Wt (!) 333 lb 5.4 oz (151.2 kg)   BMI 45.21 kg/m²          Reviewed prior testing:  Hip x-ray revealing mild arthritic changes of the right hip with inferior osteophyte noted at the femoral head. Joint space narrowing is present    ASSESSMENT:     Diagnosis Orders   1. Primary osteoarthritis of right hip            Discussion: Had lengthy discussion with patient regarding His diagnosis, typical prognosis, and expected outcomes. I reviewed the possible complications from the injury itself despite treatment choosen. I also discussed treatment options including nonoperative managements versus surgical management, along with risks and benefits of each. They have elected for conservative management at this time.       PLAN:  Weightbearing as tolerated right lower extremity  Patient recently had intra-articular right hip injection performed by Dr. George Zamudio in 12/2022. He states this was ineffective and would like to be set up for surgery. We will consider conservative measures at this time with over-the-counter pain medication, home exercise and weight management. Thorough discussion had with the patient regarding weight management. Current BMI is 45.21 kg and patient is encouraged to get below 45 in order to be considered for total hip arthroplasty. Electronically signed by Carl Millan DO on 2/1/2023 at 11:41 AM  Note: This report was completed using computerize voiced recognition software. Every effort has been made to ensure accuracy; however, inadvertent computerized transcription errors may be present. Staff Addendum    I have seen and evaluated the patient and agree with the assessment and plan as documented by the orthopaedic surgery resident. I have performed the key components of the history and physical examination and concur with the findings and plan, and have made changes where appropriate/necessary. Agree with above. He has about 3 months of right hip pain after lifting a heavy TV. He attempted a steroid injection back in December by Dr. George Zamudio which gave him no relief. X-rays show moderate osteoarthritis but still with remaining joint space. His clinical picture is complicated by his morbid obesity with BMI of 45.2. I would like for him to work on losing weight, approximately 20 to 25 pounds to get down around 42 BMI prior to right total hip replacement. This was discussed in detail with him today including higher risk of complications with higher BMI. He is in agreement with this plan. We will see him back in about 6 weeks to check his weight. We will then plan for right total hip arthroplasty if he is able to meet his goals.       Sheba Gallardo MD  [address] [place] [address]

## 2023-02-03 DIAGNOSIS — M25.551 RIGHT HIP PAIN: Primary | ICD-10-CM

## 2023-02-03 RX ORDER — HYDROCODONE BITARTRATE AND ACETAMINOPHEN 5; 325 MG/1; MG/1
1 TABLET ORAL EVERY 8 HOURS PRN
Qty: 24 TABLET | Refills: 0 | Status: SHIPPED | OUTPATIENT
Start: 2023-02-03 | End: 2023-02-24

## 2023-02-16 SDOH — ECONOMIC STABILITY: TRANSPORTATION INSECURITY
IN THE PAST 12 MONTHS, HAS LACK OF TRANSPORTATION KEPT YOU FROM MEETINGS, WORK, OR FROM GETTING THINGS NEEDED FOR DAILY LIVING?: NO

## 2023-02-16 SDOH — ECONOMIC STABILITY: FOOD INSECURITY: WITHIN THE PAST 12 MONTHS, YOU WORRIED THAT YOUR FOOD WOULD RUN OUT BEFORE YOU GOT MONEY TO BUY MORE.: NEVER TRUE

## 2023-02-16 SDOH — ECONOMIC STABILITY: HOUSING INSECURITY
IN THE LAST 12 MONTHS, WAS THERE A TIME WHEN YOU DID NOT HAVE A STEADY PLACE TO SLEEP OR SLEPT IN A SHELTER (INCLUDING NOW)?: NO

## 2023-02-16 SDOH — ECONOMIC STABILITY: FOOD INSECURITY: WITHIN THE PAST 12 MONTHS, THE FOOD YOU BOUGHT JUST DIDN'T LAST AND YOU DIDN'T HAVE MONEY TO GET MORE.: NEVER TRUE

## 2023-02-16 SDOH — ECONOMIC STABILITY: INCOME INSECURITY: HOW HARD IS IT FOR YOU TO PAY FOR THE VERY BASICS LIKE FOOD, HOUSING, MEDICAL CARE, AND HEATING?: NOT HARD AT ALL

## 2023-02-17 ENCOUNTER — TELEMEDICINE (OUTPATIENT)
Dept: PRIMARY CARE CLINIC | Age: 73
End: 2023-02-17

## 2023-02-17 DIAGNOSIS — G89.29 CHRONIC RIGHT HIP PAIN: ICD-10-CM

## 2023-02-17 DIAGNOSIS — M25.551 CHRONIC RIGHT HIP PAIN: ICD-10-CM

## 2023-02-17 RX ORDER — ACETAMINOPHEN 325 MG/1
TABLET ORAL
COMMUNITY
Start: 2021-12-14

## 2023-02-17 RX ORDER — IBUPROFEN 200 MG
TABLET ORAL
COMMUNITY
Start: 2021-12-14

## 2023-02-17 RX ORDER — HYDROCODONE BITARTRATE AND ACETAMINOPHEN 5; 325 MG/1; MG/1
1 TABLET ORAL EVERY 8 HOURS PRN
Qty: 30 TABLET | Refills: 0 | Status: SHIPPED | OUTPATIENT
Start: 2023-02-17 | End: 2023-02-27

## 2023-02-17 SDOH — ECONOMIC STABILITY: FOOD INSECURITY: WITHIN THE PAST 12 MONTHS, THE FOOD YOU BOUGHT JUST DIDN'T LAST AND YOU DIDN'T HAVE MONEY TO GET MORE.: NEVER TRUE

## 2023-02-17 SDOH — ECONOMIC STABILITY: FOOD INSECURITY: WITHIN THE PAST 12 MONTHS, YOU WORRIED THAT YOUR FOOD WOULD RUN OUT BEFORE YOU GOT MONEY TO BUY MORE.: NEVER TRUE

## 2023-02-17 SDOH — ECONOMIC STABILITY: INCOME INSECURITY: HOW HARD IS IT FOR YOU TO PAY FOR THE VERY BASICS LIKE FOOD, HOUSING, MEDICAL CARE, AND HEATING?: NOT HARD AT ALL

## 2023-02-17 ASSESSMENT — PATIENT HEALTH QUESTIONNAIRE - PHQ9
SUM OF ALL RESPONSES TO PHQ9 QUESTIONS 1 & 2: 0
SUM OF ALL RESPONSES TO PHQ QUESTIONS 1-9: 0
SUM OF ALL RESPONSES TO PHQ QUESTIONS 1-9: 0
2. FEELING DOWN, DEPRESSED OR HOPELESS: 0
SUM OF ALL RESPONSES TO PHQ QUESTIONS 1-9: 0
1. LITTLE INTEREST OR PLEASURE IN DOING THINGS: 0
SUM OF ALL RESPONSES TO PHQ QUESTIONS 1-9: 0

## 2023-02-17 NOTE — PROGRESS NOTES
TELEHEALTH VIDEO VISIT    [address] [place] [address], was evaluated through a synchronous (real-time) audio-video encounter. The patient (or guardian if applicable) is aware that this is a billable service. , which includes applicable co-pays. This virtual visit was conducted with the patient's  (and/or legal guardian's) consent. The visit was conducted pursuant to the emergency declaration under the [address] [place] [address], [address] [place] [address] authority and the [employer] Act. Patient identification was verified, and a caregiver was present when appropriate. The patient was located in a state where the provider was licensed to provide care. Patient identification was verified at the start of the visit, including the patient's telephone number and physical location. I discussed with the patient the nature of our telehealth visits, that:     Due to the nature of an audio- video modality, the only components of a physical exam that could be done are the elements supported by direct observation. I would evaluate the patient and recommend diagnostics and treatments based on my assessment. If it was felt that the patient should be evaluated in clinic or an emergency room setting, then they would be directed there. Our sessions are not being recorded and that personal health information is protected. Our team would provide follow up care in person if/when the patient needs it.        Patient's location: home address in [facility] [address]  Physician location  office address in [place]  other people involved in call  N/A     HPI:    [address] [place] [address] (:  1950) has requested an audio/video evaluation for the following concern(s):    Chief Complaint   Patient presents with    Follow-up       Spoke with pt re: persistent right hip pain   Failed injection with Dr. Mikaela Cunningham in December   He did see Dr. 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 Authorizing Provider   acetaminophen (TYLENOL) 325 MG tablet Take by mouth Yes Historical Provider, MD   ibuprofen (ADVIL;MOTRIN) 200 MG tablet Take by mouth Yes Historical Provider, MD   HYDROcodone-acetaminophen (NORCO) 5-325 MG per tablet Take 1 tablet by mouth every 8 hours as needed for Pain for up to 10 days.  Max Daily Amount: 3 tablets Yes Katharine Andres MD   finasteride (PROSCAR) 5 MG tablet Take 1 tablet by mouth once daily Yes Katharine Andres MD   triamcinolone (KENALOG) 0.025 % ointment  Yes Historical Provider, MD   potassium chloride (KLOR-CON M20) 20 MEQ extended release tablet Take 1 tablet by mouth daily Yes Katharine Andres MD   tadalafil (CIALIS) 20 MG tablet Take 1 tablet by mouth as needed for Erectile Dysfunction Yes Katharine Andres MD   furosemide (LASIX) 20 MG tablet Take 1 tablet by mouth once daily Yes Katharine Andres MD   Multiple Vitamins-Minerals (ICAPS AREDS FORMULA PO) Take by mouth Yes Historical Provider, MD   ketoconazole (NIZORAL) 2 % shampoo APPLY TOPICALLY TO SCALP 2 TO 3 DAYS PER WEEK, ALTERNATING WITH OTC ANTI-DANDRUFF SHAMPOOS EVERY MONTH AS DIRECTED Yes Historical Provider, MD   Multiple Vitamin (MULTIVITAMIN ADULT PO) Take by mouth daily Yes Historical Provider, MD   clonazePAM (KLONOPIN) 1 MG tablet Take 2 mg by mouth nightly as needed. Yes Historical Provider, MD   mometasone (NASONEX) 50 MCG/ACT nasal spray None Entered Yes Historical Provider, MD   albuterol sulfate  (90 Base) MCG/ACT inhaler Inhale 2 puffs into the lungs every 6 hours as needed Yes Historical Provider, MD       Social History     Tobacco Use    Smoking status: Never    Smokeless tobacco: Never   Substance Use Topics    Alcohol use: Not Currently    Drug use: Never            PHYSICAL EXAMINATION:  [ INSTRUCTIONS:  \"[x]\" Indicates a positive item  \"[]\" Indicates a negative item  -- DELETE ALL ITEMS NOT EXAMINED]  Vital Signs: (As obtained by patient/caregiver or practitioner observation)    Blood pressure-  Heart rate-    Respiratory rate-    Temperature-  Pulse oximetry-     Constitutional: [x] Appears well-developed and well-nourished [x] No apparent distress      [] Abnormal-   Mental status  [x] Alert and awake  [x] Oriented to person/place/time [x]Able to follow commands      Eyes:  EOM    []  Normal  [] Abnormal-  Sclera  []  Normal  [] Abnormal -         Discharge []  None visible  [] Abnormal -    HENT:   [x] Normocephalic, atraumatic.   [] Abnormal   [x] Mouth/Throat: Mucous membranes are moist.     External Ears [x] Normal  [] Abnormal-     Neck: [x] No visualized mass     Pulmonary/Chest: [x] Respiratory effort normal.  [x] No visualized signs of difficulty breathing or respiratory distress        [] Abnormal-      Musculoskeletal:   [] Normal gait with no signs of ataxia         [x] Normal range of motion of neck        [] Abnormal-       Neurological:        [x] No Facial Asymmetry (Cranial nerve 7 motor function) (limited exam to video visit)          [x] No gaze palsy        [] Abnormal-         Skin:        [x] No significant exanthematous lesions or discoloration noted on facial skin         [] Abnormal-            Psychiatric:       [x] Normal Affect [x] No Hallucinations        [] Abnormal-     Other pertinent observable physical exam findings-     ASSESSMENT/PLAN:  1. Chronic right hip pain  - HYDROcodone-acetaminophen (NORCO) 5-325 MG per tablet; Take 1 tablet by mouth every 8 hours as needed for Pain for up to 10 days. Max Daily Amount: 3 tablets  Dispense: 30 tablet; Refill: 0  Cont pain management while waiting on surgery  D/w pt that he can liberalize his usage slightly in order to get to [employer] and do other important things  No signs inappropriate use or diversion     2. Body mass index (BMI) 45.0-49.9, adult ([facility] [address]  Working on weight loss     Pt aware he'll need to schedule preop in March if moving forward with surgery  D/w pt I do not think Moh's will affect his surgical plans     Return if symptoms worsen or fail to improve. --Katharine Andres MD on 2/17/2023 at 9:08 AM    An electronic signature was used to authenticate this note.

## 2023-02-28 DIAGNOSIS — I51.89 DIASTOLIC DYSFUNCTION: ICD-10-CM

## 2023-02-28 RX ORDER — FUROSEMIDE 20 MG/1
TABLET ORAL
Qty: 90 TABLET | Refills: 1 | Status: SHIPPED | OUTPATIENT
Start: 2023-02-28

## 2023-02-28 NOTE — TELEPHONE ENCOUNTER
Last Appointment:  2/17/2023  Future Appointments   Date Time Provider Sharon Cruz   3/15/2023 11:00 AM Jayden Chow MD [facility]   4/14/2023 10:20 AM Jayden Chow MD [facility]   5/30/2023  9:30 AM Alfredo Aguilera MD [facility]

## 2023-03-15 ENCOUNTER — OFFICE VISIT (OUTPATIENT)
Dept: ORTHOPEDIC SURGERY | Age: 73
End: 2023-03-15
Payer: MEDICARE

## 2023-03-15 VITALS — WEIGHT: 303.35 LBS | HEIGHT: 72 IN | BODY MASS INDEX: 41.09 KG/M2

## 2023-03-15 DIAGNOSIS — M16.11 PRIMARY OSTEOARTHRITIS OF RIGHT HIP: Primary | ICD-10-CM

## 2023-03-15 PROCEDURE — 99213 OFFICE O/P EST LOW 20 MIN: CPT | Performed by: ORTHOPAEDIC SURGERY

## 2023-03-15 PROCEDURE — 1123F ACP DISCUSS/DSCN MKR DOCD: CPT | Performed by: ORTHOPAEDIC SURGERY

## 2023-03-15 NOTE — PATIENT INSTRUCTIONS
UPCOMING ORTHOPAEDIC SURGERY INFORMATION:    PROCEDURE: RIGHT TOTAL HIP ARTHROPLASTY   DATE: 4/27/2023  FACILITY: [facility]    Please review your surgical handout and call the office at [phone] with any questions. If requested, please obtain any clearances prior to surgery. It is patients responsibility to notify providers of any clearances needed, if your provider is requesting a form to be sent, please call the office. Expect a call from pre-admission testing the week prior regarding surgery instructions. An authorization request will be obtained by the office for any insurance approval needs. If you are to cancel surgery, please call the office with at least a 72 hour minimum, failure to do so will leave it up to the providers discretion for re-scheduling.

## 2023-03-15 NOTE — PROGRESS NOTES
[facility]   ORTHOPAEDIC SURGERY AND SPORTS MEDICINE  DATE OF VISIT: 03/15/23  Follow Up Visit     CHIEF COMPLAINT:   Chief Complaint   Patient presents with    Follow-up     Primary osteoarthritis of right hip        HPI:    Wendi Holt is a 67y.o. year old male who presented to the office today for follow up regarding his right hip osteoarthritis. At last visit we discussed the need for weight loss to optimize him for surgery. At the time his BMI was over 45. He is down now to 41. He is interested in proceeding with surgery to involve right total hip arthroplasty    REVIEW OF SYSTEMS:     Constitutional:  Negative for weight loss, fevers, chills, fatigue  Cardiovascular: Negative for chest pain, palpitations  Pulmonary: Negative for shortness of breath, labored breathing, cough  GI: negative for abdominal pain, nausea, vomiting   MSK: per HPI  Skin: negative for rash, open wounds    All other systems reviewed and are negative       Physical Exam:     Height: 6' (1.829 m), Weight: (!) 303 lb 5.7 oz (137.6 kg)    General: Alert and oriented x3, no acute distress  Cardiovascular/pulmonary: No labored breathing, peripheral perfusion intact  Musculoskeletal:    On exam, he has significant pain with any range of motion of the hip. Skin is intact laterally. He has intact motor and sensory function of the foot    Controlled Substances Monitoring:      Imaging:  X-rays reviewed showing end-stage arthritis of the right hip      Assessment: Right hip osteoarthritis      Plan:   We discussed his hip today. He has lost over 30 pounds, BMI is now down to 41. He continues to have severe pain, very limited in activities of daily living and even walking short distances. He is interested in proceeding with surgery to involve right total hip arthroplasty. He does require clearance from primary care as well as a pulmonologist given his history of pulmonary embolus.   He also has a cardiac history I suspect he will require cardiac clearance as well.  We will plan for right total hip arthroplasty in the next several weeks pending the appropriate clearances are obtained.    Cade Harden MD  Orthopaedic Surgery   3/15/23  11:43 AM

## 2023-03-16 ENCOUNTER — TELEPHONE (OUTPATIENT)
Dept: ORTHOPEDIC SURGERY | Age: 73
End: 2023-03-16

## 2023-03-16 DIAGNOSIS — M16.11 PRIMARY OSTEOARTHRITIS OF RIGHT HIP: Primary | ICD-10-CM

## 2023-03-16 NOTE — TELEPHONE ENCOUNTER
[facility]  ORTHOPAEDIC SURGERY SCHEDULING NOTE    Patient wishes to proceed after surgery discussion with Dr. Harden.     Surgical education was discussed and patient was given handout. Pre/post-op appointments were made. Patient is also aware to obtain any medical clearances prior to surgery, if requested. Notified that pre-admission testing will also be reaching out for education and instructions on arrival time.     Patient is to obtain clearance(s) from: Dr. Gonzales, cardiac and pulmonary    Authorization submitted to [facility] Medicare   Status:      Surgery: R MOLLY   OR: 4/27/23  Vendor: [facility]  Block: N/A  CPT: 56624  DX: M16.11